# Patient Record
Sex: FEMALE | Race: ASIAN | NOT HISPANIC OR LATINO | URBAN - METROPOLITAN AREA
[De-identification: names, ages, dates, MRNs, and addresses within clinical notes are randomized per-mention and may not be internally consistent; named-entity substitution may affect disease eponyms.]

---

## 2017-08-05 ENCOUNTER — INPATIENT (INPATIENT)
Facility: HOSPITAL | Age: 62
LOS: 1 days | Discharge: HOME | End: 2017-08-07
Attending: HOSPITALIST | Admitting: HOSPITALIST
Payer: MEDICAID

## 2017-08-05 VITALS
SYSTOLIC BLOOD PRESSURE: 136 MMHG | DIASTOLIC BLOOD PRESSURE: 71 MMHG | HEART RATE: 75 BPM | TEMPERATURE: 98 F | OXYGEN SATURATION: 96 % | RESPIRATION RATE: 16 BRPM

## 2017-08-05 DIAGNOSIS — R55 SYNCOPE AND COLLAPSE: ICD-10-CM

## 2017-08-05 DIAGNOSIS — M79.605 PAIN IN LEFT LEG: ICD-10-CM

## 2017-08-05 DIAGNOSIS — I10 ESSENTIAL (PRIMARY) HYPERTENSION: ICD-10-CM

## 2017-08-05 DIAGNOSIS — I48.91 UNSPECIFIED ATRIAL FIBRILLATION: ICD-10-CM

## 2017-08-05 DIAGNOSIS — Z29.9 ENCOUNTER FOR PROPHYLACTIC MEASURES, UNSPECIFIED: ICD-10-CM

## 2017-08-05 DIAGNOSIS — R63.8 OTHER SYMPTOMS AND SIGNS CONCERNING FOOD AND FLUID INTAKE: ICD-10-CM

## 2017-08-05 LAB
ALBUMIN SERPL ELPH-MCNC: 3.5 G/DL — SIGNIFICANT CHANGE UP (ref 3.3–5)
ALP SERPL-CCNC: 85 U/L — SIGNIFICANT CHANGE UP (ref 40–120)
ALT FLD-CCNC: 11 U/L — SIGNIFICANT CHANGE UP (ref 4–33)
APTT BLD: 30.8 SEC — SIGNIFICANT CHANGE UP (ref 27.5–37.4)
AST SERPL-CCNC: 14 U/L — SIGNIFICANT CHANGE UP (ref 4–32)
BASOPHILS # BLD AUTO: 0.03 K/UL — SIGNIFICANT CHANGE UP (ref 0–0.2)
BASOPHILS NFR BLD AUTO: 0.3 % — SIGNIFICANT CHANGE UP (ref 0–2)
BILIRUB SERPL-MCNC: 0.2 MG/DL — SIGNIFICANT CHANGE UP (ref 0.2–1.2)
BUN SERPL-MCNC: 11 MG/DL — SIGNIFICANT CHANGE UP (ref 7–23)
CALCIUM SERPL-MCNC: 8.5 MG/DL — SIGNIFICANT CHANGE UP (ref 8.4–10.5)
CHLORIDE SERPL-SCNC: 105 MMOL/L — SIGNIFICANT CHANGE UP (ref 98–107)
CK MB BLD-MCNC: 2.96 NG/ML — SIGNIFICANT CHANGE UP (ref 1–4.7)
CK MB BLD-MCNC: 3.16 NG/ML — SIGNIFICANT CHANGE UP (ref 1–4.7)
CK MB BLD-MCNC: SIGNIFICANT CHANGE UP (ref 0–2.5)
CK MB BLD-MCNC: SIGNIFICANT CHANGE UP (ref 0–2.5)
CK SERPL-CCNC: 102 U/L — SIGNIFICANT CHANGE UP (ref 25–170)
CK SERPL-CCNC: 112 U/L — SIGNIFICANT CHANGE UP (ref 25–170)
CO2 SERPL-SCNC: 24 MMOL/L — SIGNIFICANT CHANGE UP (ref 22–31)
CREAT SERPL-MCNC: 0.61 MG/DL — SIGNIFICANT CHANGE UP (ref 0.5–1.3)
EOSINOPHIL # BLD AUTO: 0.08 K/UL — SIGNIFICANT CHANGE UP (ref 0–0.5)
EOSINOPHIL NFR BLD AUTO: 0.8 % — SIGNIFICANT CHANGE UP (ref 0–6)
GLUCOSE SERPL-MCNC: 134 MG/DL — HIGH (ref 70–99)
HBA1C BLD-MCNC: 6.6 % — HIGH (ref 4–5.6)
HCT VFR BLD CALC: 35.6 % — SIGNIFICANT CHANGE UP (ref 34.5–45)
HGB BLD-MCNC: 11.1 G/DL — LOW (ref 11.5–15.5)
IMM GRANULOCYTES # BLD AUTO: 0.02 # — SIGNIFICANT CHANGE UP
IMM GRANULOCYTES NFR BLD AUTO: 0.2 % — SIGNIFICANT CHANGE UP (ref 0–1.5)
INR BLD: 1.03 — SIGNIFICANT CHANGE UP (ref 0.88–1.17)
LYMPHOCYTES # BLD AUTO: 2.85 K/UL — SIGNIFICANT CHANGE UP (ref 1–3.3)
LYMPHOCYTES # BLD AUTO: 29.7 % — SIGNIFICANT CHANGE UP (ref 13–44)
MAGNESIUM SERPL-MCNC: 2 MG/DL — SIGNIFICANT CHANGE UP (ref 1.6–2.6)
MCHC RBC-ENTMCNC: 26.8 PG — LOW (ref 27–34)
MCHC RBC-ENTMCNC: 31.2 % — LOW (ref 32–36)
MCV RBC AUTO: 86 FL — SIGNIFICANT CHANGE UP (ref 80–100)
MONOCYTES # BLD AUTO: 0.52 K/UL — SIGNIFICANT CHANGE UP (ref 0–0.9)
MONOCYTES NFR BLD AUTO: 5.4 % — SIGNIFICANT CHANGE UP (ref 2–14)
NEUTROPHILS # BLD AUTO: 6.09 K/UL — SIGNIFICANT CHANGE UP (ref 1.8–7.4)
NEUTROPHILS NFR BLD AUTO: 63.6 % — SIGNIFICANT CHANGE UP (ref 43–77)
NRBC # FLD: 0 — SIGNIFICANT CHANGE UP
PLATELET # BLD AUTO: 250 K/UL — SIGNIFICANT CHANGE UP (ref 150–400)
PMV BLD: 11.1 FL — SIGNIFICANT CHANGE UP (ref 7–13)
POTASSIUM SERPL-MCNC: 4 MMOL/L — SIGNIFICANT CHANGE UP (ref 3.5–5.3)
POTASSIUM SERPL-SCNC: 4 MMOL/L — SIGNIFICANT CHANGE UP (ref 3.5–5.3)
PROT SERPL-MCNC: 7.4 G/DL — SIGNIFICANT CHANGE UP (ref 6–8.3)
PROTHROM AB SERPL-ACNC: 11.6 SEC — SIGNIFICANT CHANGE UP (ref 9.8–13.1)
RBC # BLD: 4.14 M/UL — SIGNIFICANT CHANGE UP (ref 3.8–5.2)
RBC # FLD: 12.5 % — SIGNIFICANT CHANGE UP (ref 10.3–14.5)
SODIUM SERPL-SCNC: 141 MMOL/L — SIGNIFICANT CHANGE UP (ref 135–145)
TROPONIN T SERPL-MCNC: < 0.06 NG/ML — SIGNIFICANT CHANGE UP (ref 0–0.06)
TROPONIN T SERPL-MCNC: < 0.06 NG/ML — SIGNIFICANT CHANGE UP (ref 0–0.06)
TSH SERPL-MCNC: 0.84 UIU/ML — SIGNIFICANT CHANGE UP (ref 0.27–4.2)
WBC # BLD: 9.59 K/UL — SIGNIFICANT CHANGE UP (ref 3.8–10.5)
WBC # FLD AUTO: 9.59 K/UL — SIGNIFICANT CHANGE UP (ref 3.8–10.5)

## 2017-08-05 PROCEDURE — 71020: CPT | Mod: 26

## 2017-08-05 PROCEDURE — 99223 1ST HOSP IP/OBS HIGH 75: CPT

## 2017-08-05 PROCEDURE — 70450 CT HEAD/BRAIN W/O DYE: CPT | Mod: 26

## 2017-08-05 PROCEDURE — 99233 SBSQ HOSP IP/OBS HIGH 50: CPT

## 2017-08-05 RX ORDER — ACETAMINOPHEN 500 MG
650 TABLET ORAL EVERY 6 HOURS
Qty: 0 | Refills: 0 | Status: DISCONTINUED | OUTPATIENT
Start: 2017-08-05 | End: 2017-08-07

## 2017-08-05 RX ORDER — ATENOLOL 25 MG/1
25 TABLET ORAL DAILY
Qty: 0 | Refills: 0 | Status: DISCONTINUED | OUTPATIENT
Start: 2017-08-05 | End: 2017-08-07

## 2017-08-05 RX ORDER — ACETAMINOPHEN 500 MG
2 TABLET ORAL
Qty: 0 | Refills: 0 | COMMUNITY

## 2017-08-05 RX ORDER — APIXABAN 2.5 MG/1
5 TABLET, FILM COATED ORAL EVERY 12 HOURS
Qty: 0 | Refills: 0 | Status: DISCONTINUED | OUTPATIENT
Start: 2017-08-05 | End: 2017-08-07

## 2017-08-05 RX ORDER — ATENOLOL 25 MG/1
1 TABLET ORAL
Qty: 0 | Refills: 0 | COMMUNITY

## 2017-08-05 RX ADMIN — ATENOLOL 25 MILLIGRAM(S): 25 TABLET ORAL at 19:48

## 2017-08-05 RX ADMIN — APIXABAN 5 MILLIGRAM(S): 2.5 TABLET, FILM COATED ORAL at 22:30

## 2017-08-05 NOTE — H&P ADULT - ASSESSMENT
63 y/o Vanessa speaking female (translation done through [employer] ID # [id number]), with a PmHx of Afib on Eliquis, HTN, is being admitted to telemetry for unwitnessed fall r/o syncope and left leg pain.

## 2017-08-05 NOTE — CONSULT NOTE ADULT - ATTENDING COMMENTS
Patient seen and examined, agree with above assessment and plan as transcribed above.    - suspect vasovagal episode in the setting of pain  - F/u echo    Den Scott MD, FACC  [facility], [facility]  [address] [place] [address]  PHONE:  [phone]  BEEPER : [phone]

## 2017-08-05 NOTE — ED PROVIDER NOTE - OBJECTIVE STATEMENT
62yoF hx of afib on eliquis pw unwitnessed fall vs syncope. pt was getting up in the morning with cramps in her left calf and foot, trying to work them out. went to the bathroom. got up and does not remember what happened. pt was found on ground by  and son who said her eyes were rolled back and she was non responsive for approx 1 minute. then returned to and came back to baseline. pt has no complaints, only some mild left foot pain that is baseline. no chest pain, shortness of breath, abd pain, headache, numbness, chills, fevers, cough or other issues.

## 2017-08-05 NOTE — ED ADULT NURSE NOTE - OBJECTIVE STATEMENT
Patient arrived to ed room 15 a&ox4, ambulatory with assistance, complaining of left calf pain. Patient request's son to translate. Per patient, woke up to go to the bathroom, had severe leg cramp, fell to ground, cannot recall anything until EMS arrived. States no chest pain, dizziness, nausea, disorientation prior to cramp/fall. Pain chronic, states pmd has evaluated it and "everything is normal". Calf tender at touch, patient states looks more swollen to her. No redness noted. Per son similar episode, patient developed cramping in foot, became weak, son caught her before hitting ground. Patient is on Eliquis for Afib, Afib on cardiac monitor. Son at bedside. Safety maintained.

## 2017-08-05 NOTE — ED ADULT NURSE NOTE - CHIEF COMPLAINT QUOTE
pt.  brought in by EMS , Pt. on Eliquis w/ hx. AFIB c/o syncopal episode. Pt. was found on the bathroom floor by family. As per EMS pt. LOC , and is c/o of bilateral feet pain. As per EMS pt. returned to baseline at moment. Pt. Yessyjab speaking , attempted to use  phone , as per phone service unable to find a [employer]  at this time.

## 2017-08-05 NOTE — H&P ADULT - RS GEN PE MLT RESP DETAILS PC
good air movement/respirations non-labored/breath sounds equal/airway patent/no chest wall tenderness/clear to auscultation bilaterally

## 2017-08-05 NOTE — ED PROVIDER NOTE - PROGRESS NOTE DETAILS
pt offered free interpretting services. declined, wished to have son interpret instead. - Rebel Pritchard  [phone] discussed case with Dr. CLOVER Whatley, he is aware, and wishes to admit to house medicine team. - discussed with Dr. Quintanilla. will admit. paged MAR.   - discussed with patient and family, okay with plan. pt offered free interpreting services. declined, wished to have son interpret instead. - Rebel Pritchard  [phone]

## 2017-08-05 NOTE — H&P ADULT - HISTORY OF PRESENT ILLNESS
63 y/o Vanessa speaking female (translation done through [employer] ID # [id number]), with a PmHx of Afib on Eliquis, HTN, presented to [facility] ED c/o left leg pain and unwitnessed fall. Pt states at about 1900hrs last night she had gone to a wedding locally and returned back to her house at around 2300hrs. She states at around 0400hrs, she had gotten up from sleep c/o left leg/foot cramping. She states she tried to work out the pain and then got up to the bathroom and then she must have passed out. She states her  found her on the floor and then helped her up. She states she thinks she was unresponsive for about 1 minute. She denies any fever, chills, chest pain, HA, dizziness, blurred vision, n/c, sob, recent travel. The only complaint she has is left leg pain. She appears comfortable at this time. She is being admitted to telemetry for unwitnessed fall.

## 2017-08-05 NOTE — ED PROVIDER NOTE - ATTENDING CONTRIBUTION TO CARE
63 y/o F with h/o HTN, AF on eliquis BIB EMS after a syncopal episode at home.  Pt states she frequently wakes up with cramping pain to her left foot.  This happened this morning and she got up to go to the bathroom.  While walking out of the bathroom, she states the pain got so bad that she passed out, falling to the ground.  The fall was unwitnessed, but family heard her fall and when they went to check on her, she was lying on the ground.  (+)LOC of approximately 1 minute.  She denies any preceding illness, fever, chills, cp, sob, palpitations, abd pain, n/v.  Pt is now at her baseline, no reported injuries or pain.  Well appearing, lying comfortably in stretcher, awake and alert, nontoxic.  VSS.  NCAT, EOMI, PERRL.  No conjunctival pallor.  Neck is supple with no midline tenderness or deformity.  Lungs cta bl.  Cards nl S1/S2, irregularly irregular, no MRG.  Abd soft ntnd.  No pedal edema or calf tenderness, (+)mild tenderness to dorsal midfoot with no bony tenderness, no swelling or skin changes, no deformity.  Plan for ekg, labs, cxr ct head.  As pt is on eliquis, will r/o ich, plan for tele admission for synocpe workup.

## 2017-08-05 NOTE — H&P ADULT - PROBLEM SELECTOR PLAN 1
ekg/telemetry, f/u ce x 2, mg, tsh, last echo 10/4/16 was normal, ct head neg, cta chest neg, cardio c/s Dr. Whatley

## 2017-08-05 NOTE — H&P ADULT - ATTENDING COMMENTS
Patient seen at bedside. She is a 62F presenting s/p mechanical fall, being admitted to evaluate syncope. Currently has no complaints at this time and is comfortable.  VSS  General: NAD resting comfortably  HEENT: EOMI PERRLA, moist MMM, no JVD  CV: S1S2 no MRG  Lungs: CTA BL  Abdomen: soft NTND +BS  Ext: No CCE +WWP  Neuro: no focal deficits    Labs&Imaging reviewed    A/P:  62F here for evaluation of syncopal episode  1. Syncope - ddx vasovagal v orthostatic hypotension v. arrhymia. CT head negative and no focal deficits. Admit to telemetry, follow up cardiac enzymes, orthostatic pressures, possible echocardiogram although normal less than a year ago. Appreciate cardiology recs  2. AFib - c/w Eliquis for AC and Atenolol for Rate ctrl  3. HTN - c/w atenolol   4. LLE pain - apparently fell on her LLE, appears equal in size and currently not TTP. pain control

## 2017-08-05 NOTE — CONSULT NOTE ADULT - SUBJECTIVE AND OBJECTIVE BOX
Cardiology/Vascular Medicine Inpatient Consultation Note    Patient known to me from previous admission.  Told ER to admit pt to hospitalist service.      HISTORY OF PRESENT ILLNESS:  Patient is a 63 y/o Vanessa speaking female (translation done through [employer] ID # [id number]), with a PmHx of Afib on Eliquis, HTN, presented to [facility] ED c/o left leg pain and unwitnessed fall. Pt states at about 1900hrs last night she had gone to a wedding locally and returned back to her house at around 2300hrs. She states at around 0400hrs, she had gotten up from sleep c/o left leg/foot cramping. She states she tried to work out the pain and then got up to the bathroom and then she must have passed out. She states her  found her on the floor and then helped her up. She states she thinks she was unresponsive for about 1 minute. She denies any fever, chills, chest pain, HA, dizziness, blurred vision, n/c, sob, recent travel. The only complaint she has is left leg pain. She appears comfortable at this time. She is being admitted to telemetry for unwitnessed fall. (05 Aug 2017 12:13)          Allergies    No Known Allergies    Intolerances    	    MEDICATIONS:  apixaban 5 milliGRAM(s) Oral every 12 hours  ATENolol  Tablet 25 milliGRAM(s) Oral daily        acetaminophen   Tablet. 650 milliGRAM(s) Oral every 6 hours PRN            PAST MEDICAL & SURGICAL HISTORY:  Afib  HTN (hypertension)  No significant past surgical history    FAMILY HISTORY:  No pertinent family history in first degree relatives      SOCIAL HISTORY:    [ ] Non-smoker      REVIEW OF SYSTEMS:  CONSTITUTIONAL: No fever, weight loss, or fatigue  EYES: No eye pain, visual disturbances, or discharge  ENMT:  No difficulty hearing, tinnitus, vertigo; No sinus or throat pain  NECK: No pain or stiffness  RESPIRATORY: No cough, wheezing, chills or hemoptysis; No Shortness of Breath  CARDIOVASCULAR: As above  GASTROINTESTINAL: No abdominal or epigastric pain. No nausea, vomiting, or hematemesis; No diarrhea or constipation. No melena or hematochezia.  GENITOURINARY: No dysuria, frequency, hematuria, or incontinence  NEUROLOGICAL: No headaches, memory loss, loss of strength, numbness, or tremors  SKIN: No itching, burning, rashes, or lesions   LYMPH Nodes: No enlarged glands  ENDOCRINE: No heat or cold intolerance; No hair loss  MUSCULOSKELETAL: No joint pain or swelling; No muscle, back, or extremity pain  PSYCHIATRIC: No depression, anxiety, mood swings, or difficulty sleeping  HEME/LYMPH: No easy bruising, or bleeding gums  ALLERGY AND IMMUNOLOGIC: No hives or eczema	        PHYSICAL EXAM:  T(C): 36.3 (08-05-17 @ 16:08), Max: 36.7 (08-05-17 @ 08:50)  HR: 60 (08-05-17 @ 16:08) (60 - 78)  BP: 141/65 (08-05-17 @ 16:08) (136/71 - 152/97)  RR: 18 (08-05-17 @ 16:08) (16 - 18)  SpO2: 100% (08-05-17 @ 16:08) (96% - 100%)  Wt(kg): --  I&O's Summary      Appearance: Normal	  HEENT:   Normal oral mucosa, PERRL, EOMI	  Lymphatic: No lymphadenopathy  Cardiovascular: Normal S1 S2, No JVD, No murmurs, No edema  Respiratory: Lungs clear to auscultation	  Psychiatry: A & O x 3, Mood & affect appropriate  Gastrointestinal:  Soft, Non-tender, + BS	  Skin: No rashes, No ecchymoses, No cyanosis	  Neurologic: Non-focal  Extremities: Normal range of motion, No clubbing, cyanosis or edema  Vascular: Peripheral pulses palpable 2+ bilaterally      LABS:	 	                          11.1   9.59  )-----------( 250      ( 05 Aug 2017 06:45 )             35.6     08-05    141  |  105  |  11  ----------------------------<  134<H>  4.0   |  24  |  0.61    Ca    8.5      05 Aug 2017 06:45  Mg     2.0     08-05    TPro  7.4  /  Alb  3.5  /  TBili  0.2  /  DBili  x   /  AST  14  /  ALT  11  /  AlkPhos  85  08-05      proBNP:   Lipid Profile:   HgA1c: Hemoglobin A1C, Whole Blood: 6.6 % (08-05 @ 13:03)    TSH: Thyroid Stimulating Hormone, Serum: 0.84 uIU/mL (08-05 @ 13:03)        CARDIAC MARKERS:      CKMB: 2.96 ng/mL (08-05 @ 13:03)  CKMB: 3.16 ng/mL (08-05 @ 06:45)    < from: Transthoracic Echocardiogram (10.04.16 @ 11:30) >  Patient name: NEEL JUDGE  YOB: 1955   Age: 61 (F)   MR#: [id number]  Study Date: 10/4/2016  [facility]  Study quality: Technically good  Referring Physician: Quincy Whatley MD  Blood Pressure: 139/63mmHg  Height: 152 cm  Weight: 75 kg  BSA: 1.7 m2  ------------------------------------------------------------------------  PROCEDURE: Transthoracic echocardiogram with 2-D, M-Mode  and complete spectral and color flow Doppler.  INDICATION: Syncope and collapse (R55)  ------------------------------------------------------------------------  DIMENSIONS:  Dimensions:     Normal Values:  LA:     3.1 cm    2.0 - 4.0 cm  Ao:     3.2 cm    2.0 - 3.8 cm  SEPTUM: 1.0 cm    0.6 - 1.2 cm  PWT:    1.0 cm0.6 - 1.1 cm  LVIDd:  4.6 cm    3.0 - 5.6 cm  LVIDs:  2.6 cm    1.8 - 4.0 cm  Derived Variables:  LVMI: 92 g/m2  RWT: 0.43  Fractional short: 43 %  Ejection Fraction (Cliffordicholtz): 75 %  ------------------------------------------------------------------------  OBSERVATIONS:  Mitral Valve: Normal mitral valve. Minimal mitral  regurgitation.  Aortic Root: Normal aortic root.  Aortic Valve: Normal trileaflet aortic valve. No aortic  valve regurgitation seen.  Left Atrium: Normal left atrium.  LA volume index = 17  cc/m2.  Left Ventricle: Normal left ventricular systolic function.  No segmental wall motion abnormalities. Normal left  ventricular internal dimensions and wall thicknesses.  Right Heart: Normal right atrium. Normal right ventricular  size and function. Normal tricuspid valve. Mild tricuspid  regurgitation. Normal pulmonic valve.  Pericardium/PleuraNormal pericardium with no pericardial  effusion.  Hemodynamic: Estimated right ventricular systolic pressure  equals 38 mm Hg, assumingright atrial pressure equals 10  mm Hg, consistent with borderline pulmonary hypertension.  ------------------------------------------------------------------------  CONCLUSIONS:  1. Normal left ventricular internal dimensions and wall  thicknesses.  2. Normal left ventricular systolic function. No segmental  wall motion abnormalities.  3. Normal right ventricular size and function.  4. Normal tricuspid valve. Mild tricuspid regurgitation.  5. Estimated pulmonary artery systolic pressure equals 38  mm Hg, assuming right atrial pressure equals 10  mm Hg,  consistent with borderline pulmonary hypertension.  ------------------------------------------------------------------------  Confirmed on  10/4/2016 - 13:18:06 by Quincy Whatley MD, SAHRA  ------------------------------------------------------------------------    < end of copied text >    < from: CT Head No Cont (08.05.17 @ 08:38) >  EXAM:  CT BRAIN        PROCEDURE DATE:  Aug  5 2017         INTERPRETATION:  NONCONTRAST CT OF THE BRAIN DATED 8/5/2017.    CLINICAL HISTORY: Unwitnessed fall on anticoagulation.    TECHNIQUE: Axial CT images are obtained from the cranial vertex tothe   skull base without the administration of IV contrast.    COMPARISON: CT brain 10/3/2016.    FINDINGS:    There is no acute intra-axial or extra-axial hemorrhage. There is no mass   effect or shift of the midline. The basal cisterns are not effaced. The   ventricles and sulci are normal in size and configuration for age. There   are mild chronic ischemic changes seen in the frontoparietal white   matter. There is no CT evidence of a large acute or transcortical   infarct. There is redemonstration of a partially empty sella.    The visualized paranasal sinuses and mastoid air cells are well aerated.   The bony calvarium is intact, there is redemonstration of slight lucency   and sclerosis throughout the clivus and incidental ossification of the   anterior falx..    IMPRESSION:    No evidence of an acute intracranial hemorrhage or depressed calvarial   fracture.                LING FOWLER M.D., RADIOLOGY RESIDENT  This document has been electronically signed.  MAYUR MCDONOUGH M.D., ATTENDING RADIOLOGIST  This document has been electronically signed. Aug  5 2017  9:27AM                  < end of copied text >

## 2017-08-05 NOTE — ED PROVIDER NOTE - MEDICAL DECISION MAKING DETAILS
62yoF hx of afib on eliquis pw syncope vs trip and fall with + LOC. concern for cardiac origin. will rule out intracranial with head CT. labs, trops, ekg, cxr, ct head, admit for echo and tele monitoring. pt with poor cardiac follow up.

## 2017-08-05 NOTE — H&P ADULT - NSHPPHYSICALEXAM_GEN_ALL_CORE
Vital Signs Last 24 Hrs  T(C): 36.7 (05 Aug 2017 08:50), Max: 36.7 (05 Aug 2017 08:50)  T(F): 98.1 (05 Aug 2017 08:50), Max: 98.1 (05 Aug 2017 08:50)  HR: 75 (05 Aug 2017 08:50) (75 - 78)  BP: 144/75 (05 Aug 2017 08:50) (136/71 - 152/97)  BP(mean): --  RR: 16 (05 Aug 2017 08:50) (16 - 16)  SpO2: 98% (05 Aug 2017 08:50) (96% - 98%)    EKG: Afib @ 74, no changes

## 2017-08-05 NOTE — ED ADULT TRIAGE NOTE - CHIEF COMPLAINT QUOTE
pt.  brought in by EMS , Pt. on Eliquis w/ hx. AFIB c/o syncopal episode. Pt. was found on the bathroom floor by family. As per EMS pt. LOC , and is c/o of bilateral feet pain. As per EMS pt. returned to baseline at moment. Pt. Yessyjab speaking , attempted to use  phone , as per phone service unable to find a [employer]  at this time.

## 2017-08-05 NOTE — H&P ADULT - NEGATIVE OPHTHALMOLOGIC SYMPTOMS
no photophobia/no diplopia/no loss of vision R/no blurred vision R/no loss of vision L/no blurred vision L

## 2017-08-05 NOTE — H&P ADULT - NSHPSOCIALHISTORY_GEN_ALL_CORE
Marital Status:     Occupation: Homemaker    Tobacco Use: neg    ETOH Use: neg    Flu Vaccine:       neg                           Pneumonia Vaccine: neg

## 2017-08-05 NOTE — CONSULT NOTE ADULT - SUBJECTIVE AND OBJECTIVE BOX
HISTORY OF PRESENT ILLNESS:  61 y/o Vanessa speaking female (translation done through [employer] ID # [id number]), with a PmHx of Afib on Eliquis, HTN, presented to [facility] ED c/o left leg pain and unwitnessed fall. Pt states at about 1900hrs last night she had gone to a wedding locally and returned back to her house at around 2300hrs. She states at around 0400hrs, she had gotten up from sleep c/o left leg/foot cramping. She states she tried to work out the pain and then got up to the bathroom and then she must have passed out. She states her  found her on the floor and then helped her up. She states she thinks she was unresponsive for about 1 minute. She denies any fever, chills, chest pain, HA, dizziness, blurred vision, n/c, sob, recent travel. The only complaint she has is left leg pain. She appears comfortable at this time. She is being admitted to telemetry for unwitnessed fall.      PAST MEDICAL & SURGICAL HISTORY:  Afib  HTN (hypertension)  No significant past surgical history      [ ] Diabetes   [x ] Hypertension  [ ] Hyperlipidemia  [ ] CAD  [ ] PCI  [ ] CABG    PREVIOUS DIAGNOSTIC TESTING:    [ ] Echocardiogram: 10/2016      EF 75%      Nl LVS fx NL RV fx  Mild TR   [ ]  Catheterization:  [ ] Stress Test:  	    MEDICATIONS:  apixaban 5 milliGRAM(s) Oral every 12 hours  ATENolol  Tablet 25 milliGRAM(s) Oral daily      acetaminophen   Tablet. 650 milliGRAM(s) Oral every 6 hours PRN    Allergies    No Known Allergies  Intolerances  FAMILY HISTORY:  No pertinent family history in first degree relatives      SOCIAL HISTORY:    [ x] Non-smoker  [ ] Former Smoker  [ ] Current Smoker  [ ] Alcohol      REVIEW OF SYSTEMS:  [ ]chest pain  [  ]shortness of breath  [  ]palpitations  [ x ]syncope  PTA  [ ]near syncope [  ]diplopia  [  ]altered mental status   [  ]fevers  [ ]chills [ ]nausea  [ ] vomiting  [ ]abdominal pain  [ ]melena  [ ]BRBPR  [  ]epistaxis  [  ]rash  [X ] L lower extremity pain           [x ] All others negative	  [ ] Unable to obtain    PHYSICAL EXAM:  T(C): 36.7 (08-05-17 @ 08:50), Max: 36.7 (08-05-17 @ 08:50)  HR: 75 (08-05-17 @ 08:50) (75 - 78)  BP: 144/75 (08-05-17 @ 08:50) (136/71 - 152/97)  RR: 16 (08-05-17 @ 08:50) (16 - 16)  SpO2: 98% (08-05-17 @ 08:50) (96% - 98%)  Wt(kg): --  I&O's Summary      Appearance: Nosign of acute distress 	  HEENT:   Normal oral mucosa, PERRL, EOMI	  Lymphatic: No lymphadenopathy  Cardiovascular: Normal S1 S2, No JVD, II/ VI Systolic murmur, No edema  Respiratory: Lungs clear to auscultation	  Gastrointestinal:  Soft, Non-tender, + BS	  Skin: No rashes, No ecchymoses, No cyanosis	  Neurologic: Non-focal  Extremities:  No clubbing, cyanosis or edema B/L LE's   Vascular: Peripheral pulses palpable 2+ bilaterally    TELEMETRY: 	    ECG: AF @ 74 bpm 	  RADIOLOGY:   CT Head      No evidence of an acute intracranial hemorrhage or depressed calvarial   fracture.  CXR     Clear lungs. No pleural effusions or pneumothorax.    Enlarged appearing cardiac silhouette.   OTHER: 	  	  LABS:	 	    CARDIAC MARKERS:      CKMB: 3.16 ng/mL (08-05 @ 06:45)                          11.1   9.59  )-----------( 250      ( 05 Aug 2017 06:45 )             35.6     08-05    141  |  105  |  11  ----------------------------<  134<H>  4.0   |  24  |  0.61    Ca    8.5      05 Aug 2017 06:45    TPro  7.4  /  Alb  3.5  /  TBili  0.2  /  DBili  x   /  AST  14  /  ALT  11  /  AlkPhos  85  08-05    proBNP:   Lipid Profile:   HgA1c:   TSH:     ASSESSMENT/PLAN: 	61 y/o Vanessa speaking female (translation done through [employer] ID # [id number]), with a PmHx of Afib on Eliquis, HTN, presented to [facility] ED c/o left leg pain and unwitnessed fall. Pt states at about 1900hrs last night she had gone to a wedding locally and returned back to her house at around 2300hrs. She states at around 0400hrs, she had gotten up from sleep c/o left leg/foot cramping. She states she tried to work out the pain and then got up to the bathroom and then she must have passed out. She states her  found her on the floor and then helped her up. She states she thinks she was unresponsive for about 1 minute. She denies any fever, chills, chest pain, HA, dizziness, blurred vision, n/c, sob, recent travel. The only complaint she has is left leg pain. She appears comfortable at this time. She is being admitted to telemetry for unwitnessed fall.    Spoke with pt via  phone [employer] ID [id number] Earl, confirmed above events and reports all that hurts now is my leg.     HX A fib with elevated Chads Vasc Score Life long A/C recommended  for CVA prevention c/w home Eliquis     c/w atenolol  CT head no acute pathology   EKG A fib rate controlled   CE neg x 1  f/u CE #2   Echo [echo 2016 nl lvs fx]   Admit to tele   check orthostatics   TSH pending HISTORY OF PRESENT ILLNESS:  63 y/o Vanessa speaking female (translation done through [employer] ID # [id number]), with a PmHx of Afib on Eliquis, HTN, presented to [facility] ED c/o left leg pain and unwitnessed fall. Pt states at about 1900hrs last night she had gone to a wedding locally and returned back to her house at around 2300hrs. She states at around 0400hrs, she had gotten up from sleep c/o left leg/foot cramping. She states she tried to work out the pain and then got up to the bathroom and then she must have passed out. She states her  found her on the floor and then helped her up. She states she thinks she was unresponsive for about 1 minute. She denies any fever, chills, chest pain, HA, dizziness, blurred vision, n/c, sob, recent travel. The only complaint she has is left leg pain. She appears comfortable at this time. She is being admitted to telemetry for unwitnessed fall.      PAST MEDICAL & SURGICAL HISTORY:  Afib  HTN (hypertension)  No significant past surgical history      [ ] Diabetes   [x ] Hypertension  [ ] Hyperlipidemia  [ ] CAD  [ ] PCI  [ ] CABG    PREVIOUS DIAGNOSTIC TESTING:    [ ] Echocardiogram: 10/2016      EF 75%      Nl LVS fx NL RV fx  Mild TR   [ ]  Catheterization:  [ ] Stress Test:  	    MEDICATIONS:  apixaban 5 milliGRAM(s) Oral every 12 hours  ATENolol  Tablet 25 milliGRAM(s) Oral daily      acetaminophen   Tablet. 650 milliGRAM(s) Oral every 6 hours PRN    Allergies    No Known Allergies  Intolerances  FAMILY HISTORY:  No pertinent family history in first degree relatives      SOCIAL HISTORY:    [ x] Non-smoker  [ ] Former Smoker  [ ] Current Smoker  [ ] Alcohol      REVIEW OF SYSTEMS:  [ ]chest pain  [  ]shortness of breath  [  ]palpitations  [ x ]syncope  PTA  [ ]near syncope [  ]diplopia  [  ]altered mental status   [  ]fevers  [ ]chills [ ]nausea  [ ] vomiting  [ ]abdominal pain  [ ]melena  [ ]BRBPR  [  ]epistaxis  [  ]rash  [X ] L lower extremity pain           [x ] All others negative	  [ ] Unable to obtain    PHYSICAL EXAM:  T(C): 36.7 (08-05-17 @ 08:50), Max: 36.7 (08-05-17 @ 08:50)  HR: 75 (08-05-17 @ 08:50) (75 - 78)  BP: 144/75 (08-05-17 @ 08:50) (136/71 - 152/97)  RR: 16 (08-05-17 @ 08:50) (16 - 16)  SpO2: 98% (08-05-17 @ 08:50) (96% - 98%)  Wt(kg): --  I&O's Summary      Appearance: Nosign of acute distress 	  HEENT:   Normal oral mucosa, PERRL, EOMI	  Lymphatic: No lymphadenopathy  Cardiovascular: Normal S1 S2 IRR IRR, No JVD, II/ VI Systolic murmur, No edema  Respiratory: Lungs clear to auscultation	  Gastrointestinal:  Soft, Non-tender, + BS	  Skin: No rashes, No ecchymoses, No cyanosis	  Neurologic: Non-focal  Extremities:  No clubbing, cyanosis or edema B/L LE's   Vascular: Peripheral pulses palpable 2+ bilaterally    TELEMETRY: 	    ECG: AF @ 74 bpm 	  RADIOLOGY:   CT Head      No evidence of an acute intracranial hemorrhage or depressed calvarial   fracture.  CXR     Clear lungs. No pleural effusions or pneumothorax.    Enlarged appearing cardiac silhouette.   OTHER: 	  	  LABS:	 	    CARDIAC MARKERS:      CKMB: 3.16 ng/mL (08-05 @ 06:45)                          11.1   9.59  )-----------( 250      ( 05 Aug 2017 06:45 )             35.6     08-05    141  |  105  |  11  ----------------------------<  134<H>  4.0   |  24  |  0.61    Ca    8.5      05 Aug 2017 06:45    TPro  7.4  /  Alb  3.5  /  TBili  0.2  /  DBili  x   /  AST  14  /  ALT  11  /  AlkPhos  85  08-05    proBNP:   Lipid Profile:   HgA1c:   TSH:     ASSESSMENT/PLAN: 	63 y/o Vanessa speaking female (translation done through [employer] ID # [id number]), with a PmHx of Afib on Eliquis, HTN, presented to [facility] ED c/o left leg pain and unwitnessed fall. Pt states at about 1900hrs last night she had gone to a wedding locally and returned back to her house at around 2300hrs. She states at around 0400hrs, she had gotten up from sleep c/o left leg/foot cramping. She states she tried to work out the pain and then got up to the bathroom and then she must have passed out. She states her  found her on the floor and then helped her up. She states she thinks she was unresponsive for about 1 minute. She denies any fever, chills, chest pain, HA, dizziness, blurred vision, n/c, sob, recent travel. The only complaint she has is left leg pain. She appears comfortable at this time. She is being admitted to telemetry for unwitnessed fall.    Spoke with pt via  phone [employer] ID [id number] Earl, confirmed above events and reports all that hurts now is my leg.     HX A fib with elevated Chads Vasc Score Life long A/C recommended  for CVA prevention c/w home Eliquis     c/w atenolol  CT head no acute pathology   EKG A fib rate controlled   CE neg x 1  f/u CE #2   Echo [echo 2016 nl lvs fx]   Admit to tele   check orthostatics   TSH pending

## 2017-08-05 NOTE — H&P ADULT - NEGATIVE CARDIOVASCULAR SYMPTOMS
no palpitations/no paroxysmal nocturnal dyspnea/no peripheral edema/no dyspnea on exertion/no chest pain

## 2017-08-06 DIAGNOSIS — R73.09 OTHER ABNORMAL GLUCOSE: ICD-10-CM

## 2017-08-06 LAB
BUN SERPL-MCNC: 13 MG/DL — SIGNIFICANT CHANGE UP (ref 7–23)
CALCIUM SERPL-MCNC: 8.9 MG/DL — SIGNIFICANT CHANGE UP (ref 8.4–10.5)
CHLORIDE SERPL-SCNC: 104 MMOL/L — SIGNIFICANT CHANGE UP (ref 98–107)
CHOLEST SERPL-MCNC: 149 MG/DL — SIGNIFICANT CHANGE UP (ref 120–199)
CO2 SERPL-SCNC: 24 MMOL/L — SIGNIFICANT CHANGE UP (ref 22–31)
CREAT SERPL-MCNC: 0.62 MG/DL — SIGNIFICANT CHANGE UP (ref 0.5–1.3)
GLUCOSE SERPL-MCNC: 97 MG/DL — SIGNIFICANT CHANGE UP (ref 70–99)
HCT VFR BLD CALC: 33.6 % — LOW (ref 34.5–45)
HDLC SERPL-MCNC: 32 MG/DL — LOW (ref 45–65)
HGB BLD-MCNC: 10.4 G/DL — LOW (ref 11.5–15.5)
LIPID PNL WITH DIRECT LDL SERPL: 106 MG/DL — SIGNIFICANT CHANGE UP
MCHC RBC-ENTMCNC: 27.1 PG — SIGNIFICANT CHANGE UP (ref 27–34)
MCHC RBC-ENTMCNC: 31 % — LOW (ref 32–36)
MCV RBC AUTO: 87.5 FL — SIGNIFICANT CHANGE UP (ref 80–100)
NRBC # FLD: 0 — SIGNIFICANT CHANGE UP
PLATELET # BLD AUTO: 254 K/UL — SIGNIFICANT CHANGE UP (ref 150–400)
PMV BLD: 11.4 FL — SIGNIFICANT CHANGE UP (ref 7–13)
POTASSIUM SERPL-MCNC: 4.2 MMOL/L — SIGNIFICANT CHANGE UP (ref 3.5–5.3)
POTASSIUM SERPL-SCNC: 4.2 MMOL/L — SIGNIFICANT CHANGE UP (ref 3.5–5.3)
RBC # BLD: 3.84 M/UL — SIGNIFICANT CHANGE UP (ref 3.8–5.2)
RBC # FLD: 12.6 % — SIGNIFICANT CHANGE UP (ref 10.3–14.5)
SODIUM SERPL-SCNC: 142 MMOL/L — SIGNIFICANT CHANGE UP (ref 135–145)
TRIGL SERPL-MCNC: 102 MG/DL — SIGNIFICANT CHANGE UP (ref 10–149)
WBC # BLD: 10.18 K/UL — SIGNIFICANT CHANGE UP (ref 3.8–10.5)
WBC # FLD AUTO: 10.18 K/UL — SIGNIFICANT CHANGE UP (ref 3.8–10.5)

## 2017-08-06 PROCEDURE — 99233 SBSQ HOSP IP/OBS HIGH 50: CPT

## 2017-08-06 PROCEDURE — 99232 SBSQ HOSP IP/OBS MODERATE 35: CPT

## 2017-08-06 RX ORDER — SODIUM POLYSTYRENE SULFONATE 4.1 MEQ/G
30 POWDER, FOR SUSPENSION ORAL ONCE
Qty: 0 | Refills: 0 | Status: DISCONTINUED | OUTPATIENT
Start: 2017-08-06 | End: 2017-08-06

## 2017-08-06 RX ORDER — AMIODARONE HYDROCHLORIDE 400 MG/1
200 TABLET ORAL DAILY
Qty: 0 | Refills: 0 | Status: DISCONTINUED | OUTPATIENT
Start: 2017-08-06 | End: 2017-08-07

## 2017-08-06 RX ADMIN — AMIODARONE HYDROCHLORIDE 200 MILLIGRAM(S): 400 TABLET ORAL at 12:54

## 2017-08-06 RX ADMIN — ATENOLOL 25 MILLIGRAM(S): 25 TABLET ORAL at 05:52

## 2017-08-06 RX ADMIN — APIXABAN 5 MILLIGRAM(S): 2.5 TABLET, FILM COATED ORAL at 18:41

## 2017-08-06 RX ADMIN — APIXABAN 5 MILLIGRAM(S): 2.5 TABLET, FILM COATED ORAL at 05:52

## 2017-08-06 NOTE — PROGRESS NOTE ADULT - SUBJECTIVE AND OBJECTIVE BOX
Patient is a 62y old  Female who presents with a chief complaint of Unwitnessed Fall (05 Aug 2017 12:13)      SUBJECTIVE / OVERNIGHT EVENTS: patient seen and examined by bedside , denies headache, dizziness, SOB, CP, Palpitations N/V/D, abdominal pain   Tele:sinus sonu in 50s     MEDICATIONS  (STANDING):  apixaban 5 milliGRAM(s) Oral every 12 hours  ATENolol  Tablet 25 milliGRAM(s) Oral daily  amiodarone    Tablet 200 milliGRAM(s) Oral daily    MEDICATIONS  (PRN):  acetaminophen   Tablet. 650 milliGRAM(s) Oral every 6 hours PRN mild, moderate pain      Vital Signs Last 24 Hrs  T(C): 36.6 (06 Aug 2017 14:20), Max: 36.7 (06 Aug 2017 08:55)  T(F): 97.8 (06 Aug 2017 14:20), Max: 98 (06 Aug 2017 08:55)  HR: 56 (06 Aug 2017 14:20) (50 - 60)  BP: 116/66 (06 Aug 2017 14:20) (116/66 - 141/65)  BP(mean): --  RR: 18 (06 Aug 2017 14:20) (16 - 18)  SpO2: 97% (06 Aug 2017 14:20) (97% - 100%)  CAPILLARY BLOOD GLUCOSE        I&O's Summary      PHYSICAL EXAM:  GENERAL: NAD, well-developed  HEAD:  Atraumatic, Normocephalic  EYES: EOMI, PERRLA, conjunctiva and sclera clear  NECK: Supple, No JVD  CHEST/LUNG: Clear to auscultation bilaterally; No wheeze  HEART: Regular rate and rhythm; No murmurs, rubs, or gallops  ABDOMEN: Soft, Nontender, Nondistended; Bowel sounds present  EXTREMITIES:  2+ Peripheral Pulses, No clubbing, cyanosis, or edema  PSYCH: AAOx3  NEUROLOGY: non-focal  SKIN: No rashes or lesions    LABS:                        10.4   10.18 )-----------( 254      ( 06 Aug 2017 05:39 )             33.6     08-06    142  |  104  |  13  ----------------------------<  97  4.2   |  24  |  0.62    Ca    8.9      06 Aug 2017 05:39  Mg     2.0     08-05    TPro  7.4  /  Alb  3.5  /  TBili  0.2  /  DBili  x   /  AST  14  /  ALT  11  /  AlkPhos  85  08-05    PT/INR - ( 05 Aug 2017 06:45 )   PT: 11.6 SEC;   INR: 1.03          PTT - ( 05 Aug 2017 06:45 )  PTT:30.8 SEC  CARDIAC MARKERS ( 05 Aug 2017 13:03 )  x     / < 0.06 ng/mL / 102 u/L / 2.96 ng/mL / x      CARDIAC MARKERS ( 05 Aug 2017 06:45 )  x     / < 0.06 ng/mL / 112 u/L / 3.16 ng/mL / x              RADIOLOGY & ADDITIONAL TESTS:    Imaging Personally Reviewed:    Consultant(s) Notes Reviewed:      Care Discussed with Consultants/Other Providers: Patient is a 62y old  Female who presents with a chief complaint of Unwitnessed Fall (05 Aug 2017 12:13)      SUBJECTIVE / OVERNIGHT EVENTS: patient seen and examined by bedside , denies headache, dizziness, SOB, CP, Palpitations N/V/D, abdominal pain   Tele:sinus sonu in 50s     MEDICATIONS  (STANDING):  apixaban 5 milliGRAM(s) Oral every 12 hours  ATENolol  Tablet 25 milliGRAM(s) Oral daily  amiodarone    Tablet 200 milliGRAM(s) Oral daily    MEDICATIONS  (PRN):  acetaminophen   Tablet. 650 milliGRAM(s) Oral every 6 hours PRN mild, moderate pain      Vital Signs Last 24 Hrs  T(C): 36.6 (06 Aug 2017 14:20), Max: 36.7 (06 Aug 2017 08:55)  T(F): 97.8 (06 Aug 2017 14:20), Max: 98 (06 Aug 2017 08:55)  HR: 56 (06 Aug 2017 14:20) (50 - 60)  BP: 116/66 (06 Aug 2017 14:20) (116/66 - 141/65)  BP(mean): --  RR: 18 (06 Aug 2017 14:20) (16 - 18)  SpO2: 97% (06 Aug 2017 14:20) (97% - 100%)  CAPILLARY BLOOD GLUCOSE        I&O's Summary      PHYSICAL EXAM:  GENERAL: NAD, well-developed  HEAD:  Atraumatic, Normocephalic  EYES: EOMI, PERRLA, conjunctiva and sclera clear  NECK: Supple,   CHEST/LUNG: Clear to auscultation bilaterally; No wheeze  HEART: Regular rate and rhythm; No murmurs, rubs, or gallops  ABDOMEN: Soft, Nontender, Nondistended; Bowel sounds present  EXTREMITIES:  2+ Peripheral Pulses, No clubbing, cyanosis, or edema  PSYCH: AAOx3  NEUROLOGY: non-focal  SKIN: No rashes or lesions    LABS:                        10.4   10.18 )-----------( 254      ( 06 Aug 2017 05:39 )             33.6     08-06    142  |  104  |  13  ----------------------------<  97  4.2   |  24  |  0.62    Ca    8.9      06 Aug 2017 05:39  Mg     2.0     08-05    TPro  7.4  /  Alb  3.5  /  TBili  0.2  /  DBili  x   /  AST  14  /  ALT  11  /  AlkPhos  85  08-05    PT/INR - ( 05 Aug 2017 06:45 )   PT: 11.6 SEC;   INR: 1.03          PTT - ( 05 Aug 2017 06:45 )  PTT:30.8 SEC  CARDIAC MARKERS ( 05 Aug 2017 13:03 )  x     / < 0.06 ng/mL / 102 u/L / 2.96 ng/mL / x      CARDIAC MARKERS ( 05 Aug 2017 06:45 )  x     / < 0.06 ng/mL / 112 u/L / 3.16 ng/mL / x              RADIOLOGY & ADDITIONAL TESTS:    Imaging Personally Reviewed:    Consultant(s) Notes Reviewed:  cardiology     Care Discussed with Consultants/Other Providers:

## 2017-08-06 NOTE — PROVIDER CONTACT NOTE (OTHER) - BACKGROUND
Patient admitted with syncope and collapse  patient bradycardic overnight, received beta blocker in AM

## 2017-08-06 NOTE — PROGRESS NOTE ADULT - PROBLEM SELECTOR PLAN 2
ekg/telemetry, rate controlled, con't eliquis rate controlled with atenolol and amiodarone   con't eliquis

## 2017-08-06 NOTE — PROGRESS NOTE ADULT - SUBJECTIVE AND OBJECTIVE BOX
Subjective:   	 no chest pain   no shortness of breath   no  lightheadness     MEDICATIONS:  MEDICATIONS  (STANDING):  apixaban 5 milliGRAM(s) Oral every 12 hours  ATENolol  Tablet 25 milliGRAM(s) Oral daily    MEDICATIONS  (PRN):  acetaminophen   Tablet. 650 milliGRAM(s) Oral every 6 hours PRN mild, moderate pain      LABS:	 	    CARDIAC MARKERS:  CARDIAC MARKERS ( 05 Aug 2017 13:03 )  x     / < 0.06 ng/mL / 102 u/L / 2.96 ng/mL / x      CARDIAC MARKERS ( 05 Aug 2017 06:45 )  x     / < 0.06 ng/mL / 112 u/L / 3.16 ng/mL / x                      10.4   10.18 )-----------( 254      ( 06 Aug 2017 05:39 )             33.6     08-06    142  |  104  |  13  ----------------------------<  97  4.2   |  24  |  0.62    Ca    8.9      06 Aug 2017 05:39  Mg     2.0     08-05    TPro  7.4  /  Alb  3.5  /  TBili  0.2  /  DBili  x   /  AST  14  /  ALT  11  /  AlkPhos  85  08-05    COAGS:       proBNP:   Lipid Profile:   HgA1c: Hemoglobin A1C, Whole Blood: 6.6 % (08-05 @ 13:03)    TSH: Thyroid Stimulating Hormone, Serum: 0.84 uIU/mL (08-05 @ 13:03)        PHYSICAL EXAM:  T(C): 36.7 (08-06-17 @ 08:55), Max: 36.7 (08-06-17 @ 08:55)  HR: 50 (08-06-17 @ 08:55) (50 - 60)  BP: 123/50 (08-06-17 @ 08:55) (120/61 - 141/65)  RR: 16 (08-06-17 @ 08:55) (16 - 18)  SpO2: 99% (08-06-17 @ 08:55) (98% - 100%)  Wt(kg): --  I&O's Summary    Height (cm): 152.4 (08-05 @ 12:13)  Weight (kg): 67 (08-05 @ 12:13)  BMI (kg/m2): 28.8 (08-05 @ 12:13)  BSA (m2): 1.64 (08-05 @ 12:13)      Cardiovascular: Normal S1 S2,RRR   No JVD, ii/vi systolic murmur , Peripheral pulses palpable 2+ bilaterally  Respiratory: Lungs clear to auscultation, normal effort 	  Gastrointestinal:  Soft, Non-tender, + BS	  Extremities no edema, cyanosis, clubbing B/L LE's       TELEMETRY: 	SR    ECG:  A fib 	  RADIOLOGY:    CT Head      No evidence of an acute intracranial hemorrhage or depressed calvarial   fracture.  CXR     Clear lungs. No pleural effusions or pneumothorax.    Enlarged appearing cardiac silhouette.       DIAGNOSTIC TESTING:  [ ] Echocardiogram: 10/2016      EF 75%      Nl LVS fx NL RV fx  Mild TR     [ ]  Catheterization:  [ ] Stress Test:    OTHER: 	      ASSESSMENT/PLAN: 	62y Female Simeon known to our office who is  Vanessa speaking  (info obtained via  [employer]), with a PmHx of Afib on Eliquis, HTN  Presented to [facility] ED c/o left leg pain and unwitnessed fall.      HX A fib with elevated Chads Vasc Score Life long A/C recommended  for CVA prevention c/w home Eliquis     c/w atenolol  CT head no acute pathology   EKG A fib rate controlled converted to SR   CE neg x 2   would check  Echo    tele now sr   check orthostatics   TSH Nl   L LE Doppler  pending

## 2017-08-06 NOTE — PROGRESS NOTE ADULT - PROBLEM SELECTOR PLAN 3
monitor bp, con't meds, adjust as needed BP controlled   monitor bp,   con't meds   adjust as needed

## 2017-08-06 NOTE — PROGRESS NOTE ADULT - SUBJECTIVE AND OBJECTIVE BOX
Cardiology/Vascular Medicine Inpatient Consultation Note    Patient known to me from previous admission.  Told ER to admit pt to hospitalist service.    Feels better this AM.  No complaints.  No events on telemetry,    Vital Signs Last 24 Hrs  T(C): 36.6 (06 Aug 2017 05:49), Max: 36.7 (05 Aug 2017 08:50)  T(F): 97.8 (06 Aug 2017 05:49), Max: 98.1 (05 Aug 2017 08:50)  HR: 60 (06 Aug 2017 05:49) (60 - 75)  BP: 126/63 (06 Aug 2017 05:49) (120/61 - 144/75)  BP(mean): --  RR: 18 (06 Aug 2017 05:49) (16 - 18)  SpO2: 98% (06 Aug 2017 05:49) (98% - 100%)    Appearance: Normal	  HEENT:   Normal oral mucosa, PERRL, EOMI	  Lymphatic: No lymphadenopathy  Cardiovascular: Normal S1 S2, No JVD, No murmurs, No edema  Respiratory: Lungs clear to auscultation	  Psychiatry: A & O x 3, Mood & affect appropriate  Gastrointestinal:  Soft, Non-tender, + BS	  Skin: No rashes, No ecchymoses, No cyanosis	  Neurologic: Non-focal  Extremities: Normal range of motion, No clubbing, cyanosis or edema  Vascular: Peripheral pulses palpable 2+ bilaterally    MEDICATIONS  (STANDING):  apixaban 5 milliGRAM(s) Oral every 12 hours  ATENolol  Tablet 25 milliGRAM(s) Oral daily    MEDICATIONS  (PRN):  acetaminophen   Tablet. 650 milliGRAM(s) Oral every 6 hours PRN mild, moderate pain      LABS:	 	                          11.1   9.59  )-----------( 250      ( 05 Aug 2017 06:45 )             35.6     08-05    141  |  105  |  11  ----------------------------<  134<H>  4.0   |  24  |  0.61    Ca    8.5      05 Aug 2017 06:45  Mg     2.0     08-05    TPro  7.4  /  Alb  3.5  /  TBili  0.2  /  DBili  x   /  AST  14  /  ALT  11  /  AlkPhos  85  08-05

## 2017-08-06 NOTE — PROGRESS NOTE ADULT - ASSESSMENT
61 y/o Vanessa speaking female (translation done through [employer] ID # [id number]), with a PmHx of Afib on Eliquis, HTN, is being admitted to telemetry for unwitnessed fall r/o syncope and left leg pain. 63 y/o Vanessa speaking female, with a PmHx of Afib on Eliquis, HTN, is being admitted to telemetry for unwitnessed fall r/o syncope and left leg pain.

## 2017-08-06 NOTE — PROVIDER CONTACT NOTE (OTHER) - SITUATION
Patient sinus bradycardic on telemetry monitor (currently [id number] Patient sinus bradycardic on telemetry monitor (currently [id number])

## 2017-08-06 NOTE — PROGRESS NOTE ADULT - ATTENDING COMMENTS
Patient seen and examined, agree with above assessment and plan as transcribed above.    - F/u echo  -Suspect vasovagal event in the setting of pain    Den Scott MD, [facility]  [facility], [facility]  [address] [place] [address]  PHONE:  [phone]  BEEPER : [phone]

## 2017-08-06 NOTE — PROVIDER CONTACT NOTE (OTHER) - ACTION/TREATMENT ORDERED:
Continue to monitor patient for symptomatic bradycardia  notify provider for heart rate <50 or symptomatic bradycardia

## 2017-08-06 NOTE — PROGRESS NOTE ADULT - PROBLEM SELECTOR PLAN 1
ekg/telemetry, f/u ce x 2, mg, tsh, last echo 10/4/16 was normal, ct head neg, cta chest neg, cardio c/s Dr. Whatley Pt denies dizziness , lightheadedness  at this time   Telemetry: no acute events    ce x 2 negative  last echo 10/4/16 was normal, ct head neg, cta chest neg,   cardio c/s Dr. Whatley appreciated, recommend f/u Echo, if unremarkable, then Dc home

## 2017-08-07 VITALS — OXYGEN SATURATION: 99 % | RESPIRATION RATE: 15 BRPM | TEMPERATURE: 98 F

## 2017-08-07 LAB
BUN SERPL-MCNC: 12 MG/DL — SIGNIFICANT CHANGE UP (ref 7–23)
CALCIUM SERPL-MCNC: 9 MG/DL — SIGNIFICANT CHANGE UP (ref 8.4–10.5)
CHLORIDE SERPL-SCNC: 103 MMOL/L — SIGNIFICANT CHANGE UP (ref 98–107)
CO2 SERPL-SCNC: 24 MMOL/L — SIGNIFICANT CHANGE UP (ref 22–31)
CREAT SERPL-MCNC: 0.63 MG/DL — SIGNIFICANT CHANGE UP (ref 0.5–1.3)
GLUCOSE SERPL-MCNC: 112 MG/DL — HIGH (ref 70–99)
HCT VFR BLD CALC: 35 % — SIGNIFICANT CHANGE UP (ref 34.5–45)
HGB BLD-MCNC: 11 G/DL — LOW (ref 11.5–15.5)
MCHC RBC-ENTMCNC: 27 PG — SIGNIFICANT CHANGE UP (ref 27–34)
MCHC RBC-ENTMCNC: 31.4 % — LOW (ref 32–36)
MCV RBC AUTO: 85.8 FL — SIGNIFICANT CHANGE UP (ref 80–100)
NRBC # FLD: 0 — SIGNIFICANT CHANGE UP
PLATELET # BLD AUTO: 253 K/UL — SIGNIFICANT CHANGE UP (ref 150–400)
PMV BLD: 11.1 FL — SIGNIFICANT CHANGE UP (ref 7–13)
POTASSIUM SERPL-MCNC: 4.1 MMOL/L — SIGNIFICANT CHANGE UP (ref 3.5–5.3)
POTASSIUM SERPL-SCNC: 4.1 MMOL/L — SIGNIFICANT CHANGE UP (ref 3.5–5.3)
RBC # BLD: 4.08 M/UL — SIGNIFICANT CHANGE UP (ref 3.8–5.2)
RBC # FLD: 12.3 % — SIGNIFICANT CHANGE UP (ref 10.3–14.5)
SODIUM SERPL-SCNC: 140 MMOL/L — SIGNIFICANT CHANGE UP (ref 135–145)
WBC # BLD: 9.81 K/UL — SIGNIFICANT CHANGE UP (ref 3.8–10.5)
WBC # FLD AUTO: 9.81 K/UL — SIGNIFICANT CHANGE UP (ref 3.8–10.5)

## 2017-08-07 PROCEDURE — 93971 EXTREMITY STUDY: CPT | Mod: 26,LT

## 2017-08-07 PROCEDURE — 93306 TTE W/DOPPLER COMPLETE: CPT | Mod: 26

## 2017-08-07 PROCEDURE — 99232 SBSQ HOSP IP/OBS MODERATE 35: CPT

## 2017-08-07 PROCEDURE — 99239 HOSP IP/OBS DSCHRG MGMT >30: CPT

## 2017-08-07 RX ADMIN — ATENOLOL 25 MILLIGRAM(S): 25 TABLET ORAL at 07:21

## 2017-08-07 RX ADMIN — AMIODARONE HYDROCHLORIDE 200 MILLIGRAM(S): 400 TABLET ORAL at 07:21

## 2017-08-07 RX ADMIN — APIXABAN 5 MILLIGRAM(S): 2.5 TABLET, FILM COATED ORAL at 17:17

## 2017-08-07 RX ADMIN — APIXABAN 5 MILLIGRAM(S): 2.5 TABLET, FILM COATED ORAL at 07:20

## 2017-08-07 NOTE — PROGRESS NOTE ADULT - PROBLEM SELECTOR PLAN 1
No events on telemetry  f/u Echo, if unremarkable, then Dc home  Cardio recs appreciated: suspect vasovagal syncope  F/U orthostatics No events on telemetry  f/u Echo, if unremarkable, then Dc home  Cardio recs appreciated: suspect vasovagal syncope  orthostatics negative

## 2017-08-07 NOTE — PROGRESS NOTE ADULT - PROBLEM SELECTOR PLAN 2
rate controlled with atenolol and amiodarone   con't eliquis rate controlled with atenolol. DC amiodarone: Clarified with outpt pharmacy that patient not taking this medication.  con't [facility] following for cardiology

## 2017-08-07 NOTE — DISCHARGE NOTE ADULT - HOSPITAL COURSE
61 y/o Vanessa speaking female (translation done through [employer] ID # [id number]), with a PmHx of Afib on Eliquis, HTN, presented to [facility] ED c/o left leg pain and unwitnessed fall. Pt states at about 1900hrs last night she had gone to a wedding locally and returned back to her house at around 2300hrs. She states at around 0400hrs, she had gotten up from sleep c/o left leg/foot cramping. She states she tried to work out the pain and then got up to the bathroom and then she must have passed out. She states her  found her on the floor and then helped her up. She states she thinks she was unresponsive for about 1 minute. She denies any fever, chills, chest pain, HA, dizziness, blurred vision, n/c, sob, recent travel. The only complaint she has is left leg pain. She appears comfortable at this time. She is being admitted to telemetry for unwitnessed fall.    Hospital course:  EKG: Afib @ 74, no changes  CE x 2 neg               Glucose: 134	HbA1C: 6.6%	TSH: 0.84  CT Head - no evidence of an acute intracranial hemorrhage or depressed calvarial fracture.  CXR: Clear lungs.     Pt seen by cardiology, CE negative, EKG with no ischemic change. Pt intermittently going into afib on telemetry. Pt continue on ome eliquis and BB for rate control. TTE *****. LLE doppler ****. Orthostatic ***. Syncope episode likely associated with vasovagal episode****    Pt cleared for discharge 63 y/o Vanessa speaking female (translation done through [employer] ID # [id number]), with a PmHx of Afib on Eliquis, HTN, presented to [facility] ED c/o left leg pain and unwitnessed fall. Pt states at about 1900hrs last night she had gone to a wedding locally and returned back to her house at around 2300hrs. She states at around 0400hrs, she had gotten up from sleep c/o left leg/foot cramping. She states she tried to work out the pain and then got up to the bathroom and then she must have passed out. She states her  found her on the floor and then helped her up. She states she thinks she was unresponsive for about 1 minute. She denies any fever, chills, chest pain, HA, dizziness, blurred vision, n/c, sob, recent travel. The only complaint she has is left leg pain. She appears comfortable at this time. She is being admitted to telemetry for unwitnessed fall.    Hospital course:  EKG: Afib @ 74, no changes  CE x 2 neg               Glucose: 134	HbA1C: 6.6%	TSH: 0.84  CT Head - no evidence of an acute intracranial hemorrhage or depressed calvarial fracture.  CXR: Clear lungs.     Pt seen by cardiology, CE negative, EKG with no ischemic change. Pt intermittently going into afib on telemetry. Pt continue on ome eliquis and BB for rate control. TTE with normal EF and no wall motion abnormality. LLE doppler negative for DVT. Orthostatic negative. Syncope episode likely associated with vasovagal episode. Telemetry noted for 2.4 sec pause but pt asymptomatic, case discuss with attending and cards continue current medication and clear for discharge 63 y/o Vanessa speaking female (translation done through [employer] ID # [id number]), with a PmHx of Afib on Eliquis, HTN, presented to [facility] ED c/o left leg pain and unwitnessed fall. Pt states at about 1900hrs last night she had gone to a wedding locally and returned back to her house at around 2300hrs. She states at around 0400hrs, she had gotten up from sleep c/o left leg/foot cramping. She states she tried to work out the pain and then got up to the bathroom and then she must have passed out. She states her  found her on the floor and then helped her up. She states she thinks she was unresponsive for about 1 minute. She denies any fever, chills, chest pain, HA, dizziness, blurred vision, n/c, sob, recent travel. The only complaint she has is left leg pain. She appears comfortable at this time. She is being admitted to telemetry for unwitnessed fall.    Hospital course:  EKG: Afib @ 74, no changes  CE x 2 neg               Glucose: 134	HbA1C: 6.6%	TSH: 0.84  CT Head - no evidence of an acute intracranial hemorrhage or depressed calvarial fracture.  CXR: Clear lungs.     Pt seen by cardiology, CE negative, EKG with no ischemic change. Pt intermittently going into afib on telemetry. Pt continue on eliquis and BB for rate control. TTE with normal EF and no wall motion abnormality. LLE doppler negative for DVT. Orthostatic negative. Syncope episode likely associated with vasovagal episode. Telemetry noted for 2.4 sec pause but pt asymptomatic, case discuss with attending and cards continue current medication and clear for discharge with outpatient followup

## 2017-08-07 NOTE — DISCHARGE NOTE ADULT - PLAN OF CARE
prevent future episode please follow up with your cardiologist or Dr Yarbrough in 2-3 weeks. continue home medication. follow up with your PMD in 2-3 weeks please follow up with your cardiologist or Dr Yarbrough in 2-3 weeks. continue home medication your ultrasound of leg is normal. take tylenol prn for pain continue home medication

## 2017-08-07 NOTE — DISCHARGE NOTE ADULT - PROVIDER TOKENS
TOKEN:'[id number]',TOKEN:'[id number]',FREE:[LAST:[Dr Reis],PHONE:[phone]],FAX:[(   )    -]]

## 2017-08-07 NOTE — PROGRESS NOTE ADULT - PROBLEM SELECTOR PLAN 7
HbAic is 6.6  f/u repeat as outpat, diet control   lipid profile wnl
HbAic is 6.6  f/u repeat as outpat, diet control   lipid profile wnl

## 2017-08-07 NOTE — PROGRESS NOTE ADULT - SUBJECTIVE AND OBJECTIVE BOX
Subjective: No events  	  MEDICATIONS:  MEDICATIONS  (STANDING):  apixaban 5 milliGRAM(s) Oral every 12 hours  ATENolol  Tablet 25 milliGRAM(s) Oral daily  amiodarone    Tablet 200 milliGRAM(s) Oral daily      LABS:	 	    CARDIAC MARKERS:  CARDIAC MARKERS ( 05 Aug 2017 13:03 )  x     / < 0.06 ng/mL / 102 u/L / 2.96 ng/mL / x      CARDIAC MARKERS ( 05 Aug 2017 06:45 )  x     / < 0.06 ng/mL / 112 u/L / 3.16 ng/mL / x                                    11.0   9.81  )-----------( 253      ( 07 Aug 2017 05:42 )             35.0     08-07    140  |  103  |  12  ----------------------------<  112<H>  4.1   |  24  |  0.63    Ca    9.0      07 Aug 2017 05:42  Mg     2.0     08-05      proBNP:   Lipid Profile:   HgA1c:   TSH:       PHYSICAL EXAM:  T(C): 36.6 (08-07-17 @ 07:19), Max: 36.6 (08-06-17 @ 14:20)  HR: 60 (08-07-17 @ 07:19) (56 - 62)  BP: 123/72 (08-07-17 @ 07:19) (116/66 - 141/62)  RR: 18 (08-07-17 @ 07:19) (18 - 18)  SpO2: 98% (08-07-17 @ 07:19) (97% - 98%)  Wt(kg): --  I&O's Summary    Heart: normal S1, S2, RRR, no m/r/g  Lungs: CTA b/l  Abd: soft nT, nD  Ext: no edema         TELEMETRY: SR/SB	    ECG:  	  RADIOLOGY:   DIAGNOSTIC TESTING:  [ ] Echocardiogram: pending   [ ]  Catheterization:  [ ] Stress Test:    OTHER: 	      ASSESSMENT/PLAN: 	62y Femael with h/o of Afib on Eliquis, HTN  Presented to [facility] ED c/o left leg pain and unwitnessed fall.    - f/u TTE  -Continue with jessica licea for cva prevention  -Would continue with rate control strategy for asymptomatic PAF  -Given her age and rate control strategy being implemented, would hold amio unless otherwise indicated

## 2017-08-07 NOTE — PROGRESS NOTE ADULT - ASSESSMENT
61 y/o Vanessa speaking female, with a PmHx of Afib on Eliquis, HTN, is being admitted to telemetry for unwitnessed fall r/o syncope and left leg pain.

## 2017-08-07 NOTE — PROGRESS NOTE ADULT - SUBJECTIVE AND OBJECTIVE BOX
Patient is a 62y old  Female who presents with a chief complaint of Unwitnessed Fall (07 Aug 2017 11:35)      SUBJECTIVE / OVERNIGHT EVENTS: No acute events overnight. No new complaints    MEDICATIONS  (STANDING):  apixaban 5 milliGRAM(s) Oral every 12 hours  ATENolol  Tablet 25 milliGRAM(s) Oral daily  amiodarone    Tablet 200 milliGRAM(s) Oral daily    MEDICATIONS  (PRN):  acetaminophen   Tablet. 650 milliGRAM(s) Oral every 6 hours PRN mild, moderate pain      T(C): 36.6 (08-07-17 @ 07:19), Max: 36.6 (08-06-17 @ 14:20)  HR: 60 (08-07-17 @ 07:19) (56 - 62)  BP: 123/72 (08-07-17 @ 07:19) (116/66 - 141/62)  RR: 18 (08-07-17 @ 07:19) (18 - 18)  SpO2: 98% (08-07-17 @ 07:19) (97% - 98%)  CAPILLARY BLOOD GLUCOSE        I&O's Summary      PHYSICAL EXAM:  GENERAL: NAD, well-developed  EYES: sclera clear b/l  CHEST/LUNG: Clear to auscultation bilaterally; No wheezing  HEART: Regular rate and rhythm; No murmurs, rubs, or gallops  ABDOMEN: Soft, Nontender, Nondistended; Bowel sounds present  EXTREMITIES:  no clubbing, cyanosis, or edema  PSYCH: AAOx3  NEUROLOGY: non-focal  SKIN: No rashes or lesions    LABS:                        11.0   9.81  )-----------( 253      ( 07 Aug 2017 05:42 )             35.0     08-07    140  |  103  |  12  ----------------------------<  112<H>  4.1   |  24  |  0.63    Ca    9.0      07 Aug 2017 05:42  Mg     2.0     08-05        CARDIAC MARKERS ( 05 Aug 2017 13:03 )  x     / < 0.06 ng/mL / 102 u/L / 2.96 ng/mL / x              RADIOLOGY & ADDITIONAL TESTS:

## 2017-08-07 NOTE — DISCHARGE NOTE ADULT - CARE PROVIDER_API CALL
Yakelin Monaco (), Internal Medicine  [id number] [place] [address]  Phone: [phone]  Fax: [phone]    Tiffanie Yarbrough (MD), Cardiovascular Disease; Internal Medicine; Interventional Cardiology  [address] [place] [address]  Phone: [phone]  Fax: [phone]    Dr Reis,   Phone: [phone]  Fax: (   )    -

## 2017-08-07 NOTE — DISCHARGE NOTE ADULT - INSTRUCTIONS
please follow up with your cardiologist or Dr Yarbrough in 2-3 weeks. continue home medication. follow up with your PMD in 2-3 weeks

## 2017-08-07 NOTE — DISCHARGE NOTE ADULT - MEDICATION SUMMARY - MEDICATIONS TO TAKE
I will START or STAY ON the medications listed below when I get home from the hospital:    Tylenol 325 mg oral tablet  -- 2 tab(s) by mouth every 4 hours, As Needed  -- Indication: For Pain    apixaban 5 mg oral tablet  -- 1 tab(s) by mouth 2 times a day  -- Indication: For Afib    atenolol 25 mg oral tablet  -- 1 tab(s) by mouth once a day  -- Indication: For Afib

## 2017-08-07 NOTE — DISCHARGE NOTE ADULT - CARE PROVIDERS DIRECT ADDRESSES
[email or web address],DirectAddress_Unknown,DirectAddress_Unknown

## 2018-07-21 NOTE — ED PROVIDER NOTE - CPE EDP MUSC NORM
Pt requested metoprolol refilled. Sent it to pharmacy for 1m and asked him to call our office to talk to Dr Velazquez's nurse so that can send refills  
normal...

## 2025-03-03 NOTE — ED PROVIDER NOTE - PROGRESS NOTE3
Get me your paperwork and we'll plan on getting you on half-time on 3/13/25.      Can try PT to help with planning for work.    Can increase L-arginine to 1500 mg/day if needed.    Let me know if you decide you want to see neurology.    Contact us or return if questions or concerns.    Have a nice day!    Dr. Jolley    
DISPLAY PLAN FREE TEXT
DISPLAY PLAN FREE TEXT
Stable.

## 2025-07-03 ENCOUNTER — EMERGENCY (EMERGENCY)
Facility: HOSPITAL | Age: 70
LOS: 1 days | End: 2025-07-03
Attending: EMERGENCY MEDICINE | Admitting: EMERGENCY MEDICINE
Payer: MEDICARE

## 2025-07-03 VITALS
TEMPERATURE: 98 F | HEART RATE: 60 BPM | OXYGEN SATURATION: 96 % | RESPIRATION RATE: 17 BRPM | SYSTOLIC BLOOD PRESSURE: 144 MMHG | WEIGHT: 156.09 LBS | HEIGHT: 63 IN | DIASTOLIC BLOOD PRESSURE: 67 MMHG

## 2025-07-03 LAB
ADD ON TEST-SPECIMEN IN LAB: SIGNIFICANT CHANGE UP
ALBUMIN SERPL ELPH-MCNC: 3.6 G/DL — SIGNIFICANT CHANGE UP (ref 3.3–5)
ALP SERPL-CCNC: 85 U/L — SIGNIFICANT CHANGE UP (ref 40–120)
ALT FLD-CCNC: 7 U/L — SIGNIFICANT CHANGE UP (ref 4–33)
ANION GAP SERPL CALC-SCNC: 13 MMOL/L — SIGNIFICANT CHANGE UP (ref 7–14)
APTT BLD: 32.9 SEC — SIGNIFICANT CHANGE UP (ref 26.1–36.8)
AST SERPL-CCNC: 13 U/L — SIGNIFICANT CHANGE UP (ref 4–32)
BASOPHILS # BLD AUTO: 0.03 K/UL — SIGNIFICANT CHANGE UP (ref 0–0.2)
BASOPHILS NFR BLD AUTO: 0.3 % — SIGNIFICANT CHANGE UP (ref 0–2)
BILIRUB SERPL-MCNC: 0.2 MG/DL — SIGNIFICANT CHANGE UP (ref 0.2–1.2)
BUN SERPL-MCNC: 18 MG/DL — SIGNIFICANT CHANGE UP (ref 7–23)
CALCIUM SERPL-MCNC: 9.2 MG/DL — SIGNIFICANT CHANGE UP (ref 8.4–10.5)
CHLORIDE SERPL-SCNC: 105 MMOL/L — SIGNIFICANT CHANGE UP (ref 98–107)
CO2 SERPL-SCNC: 22 MMOL/L — SIGNIFICANT CHANGE UP (ref 22–31)
CREAT SERPL-MCNC: 1.17 MG/DL — SIGNIFICANT CHANGE UP (ref 0.5–1.3)
EGFR: 51 ML/MIN/1.73M2 — LOW
EGFR: 51 ML/MIN/1.73M2 — LOW
EOSINOPHIL # BLD AUTO: 0.14 K/UL — SIGNIFICANT CHANGE UP (ref 0–0.5)
EOSINOPHIL NFR BLD AUTO: 1.4 % — SIGNIFICANT CHANGE UP (ref 0–6)
GLUCOSE SERPL-MCNC: 133 MG/DL — HIGH (ref 70–99)
HCT VFR BLD CALC: 29.3 % — LOW (ref 34.5–45)
HGB BLD-MCNC: 9.3 G/DL — LOW (ref 11.5–15.5)
IMM GRANULOCYTES # BLD AUTO: 0.02 K/UL — SIGNIFICANT CHANGE UP (ref 0–0.07)
IMM GRANULOCYTES NFR BLD AUTO: 0.2 % — SIGNIFICANT CHANGE UP (ref 0–0.9)
INR BLD: 1.31 RATIO — HIGH (ref 0.85–1.16)
LYMPHOCYTES # BLD AUTO: 3.23 K/UL — SIGNIFICANT CHANGE UP (ref 1–3.3)
LYMPHOCYTES NFR BLD AUTO: 33.4 % — SIGNIFICANT CHANGE UP (ref 13–44)
MCHC RBC-ENTMCNC: 26.7 PG — LOW (ref 27–34)
MCHC RBC-ENTMCNC: 31.7 G/DL — LOW (ref 32–36)
MCV RBC AUTO: 84.2 FL — SIGNIFICANT CHANGE UP (ref 80–100)
MONOCYTES # BLD AUTO: 0.63 K/UL — SIGNIFICANT CHANGE UP (ref 0–0.9)
MONOCYTES NFR BLD AUTO: 6.5 % — SIGNIFICANT CHANGE UP (ref 2–14)
NEUTROPHILS # BLD AUTO: 5.63 K/UL — SIGNIFICANT CHANGE UP (ref 1.8–7.4)
NEUTROPHILS NFR BLD AUTO: 58.2 % — SIGNIFICANT CHANGE UP (ref 43–77)
NRBC # BLD AUTO: 0 K/UL — SIGNIFICANT CHANGE UP (ref 0–0)
NRBC # FLD: 0 K/UL — SIGNIFICANT CHANGE UP (ref 0–0)
NRBC BLD AUTO-RTO: 0 /100 WBCS — SIGNIFICANT CHANGE UP (ref 0–0)
PLATELET # BLD AUTO: 261 K/UL — SIGNIFICANT CHANGE UP (ref 150–400)
PMV BLD: 11.3 FL — SIGNIFICANT CHANGE UP (ref 7–13)
POTASSIUM SERPL-MCNC: 4.4 MMOL/L — SIGNIFICANT CHANGE UP (ref 3.5–5.3)
POTASSIUM SERPL-SCNC: 4.4 MMOL/L — SIGNIFICANT CHANGE UP (ref 3.5–5.3)
PROT SERPL-MCNC: 7.3 G/DL — SIGNIFICANT CHANGE UP (ref 6–8.3)
PROTHROM AB SERPL-ACNC: 15.5 SEC — HIGH (ref 9.9–13.4)
RBC # BLD: 3.48 M/UL — LOW (ref 3.8–5.2)
RBC # FLD: 13.2 % — SIGNIFICANT CHANGE UP (ref 10.3–14.5)
SODIUM SERPL-SCNC: 140 MMOL/L — SIGNIFICANT CHANGE UP (ref 135–145)
TROPONIN T, HIGH SENSITIVITY RESULT: 10 NG/L — SIGNIFICANT CHANGE UP
TROPONIN T, HIGH SENSITIVITY RESULT: 10 NG/L — SIGNIFICANT CHANGE UP
WBC # BLD: 9.68 K/UL — SIGNIFICANT CHANGE UP (ref 3.8–10.5)
WBC # FLD AUTO: 9.68 K/UL — SIGNIFICANT CHANGE UP (ref 3.8–10.5)

## 2025-07-03 PROCEDURE — 71046 X-RAY EXAM CHEST 2 VIEWS: CPT | Mod: 26

## 2025-07-03 PROCEDURE — 93010 ELECTROCARDIOGRAM REPORT: CPT

## 2025-07-03 PROCEDURE — 99223 1ST HOSP IP/OBS HIGH 75: CPT

## 2025-07-03 RX ORDER — DEXTROSE 50 % IN WATER 50 %
25 SYRINGE (ML) INTRAVENOUS ONCE
Refills: 0 | Status: DISCONTINUED | OUTPATIENT
Start: 2025-07-03 | End: 2025-07-07

## 2025-07-03 RX ORDER — LISINOPRIL 30 MG/1
25 TABLET ORAL DAILY
Refills: 0 | Status: DISCONTINUED | OUTPATIENT
Start: 2025-07-03 | End: 2025-07-07

## 2025-07-03 RX ORDER — INSULIN LISPRO 100 U/ML
INJECTION, SOLUTION INTRAVENOUS; SUBCUTANEOUS AT BEDTIME
Refills: 0 | Status: DISCONTINUED | OUTPATIENT
Start: 2025-07-03 | End: 2025-07-07

## 2025-07-03 RX ORDER — DEXTROSE 50 % IN WATER 50 %
12.5 SYRINGE (ML) INTRAVENOUS ONCE
Refills: 0 | Status: DISCONTINUED | OUTPATIENT
Start: 2025-07-03 | End: 2025-07-07

## 2025-07-03 RX ORDER — APIXABAN 2.5 MG/1
5 TABLET, FILM COATED ORAL EVERY 12 HOURS
Refills: 0 | Status: DISCONTINUED | OUTPATIENT
Start: 2025-07-03 | End: 2025-07-07

## 2025-07-03 RX ORDER — SODIUM CHLORIDE 9 G/1000ML
1000 INJECTION, SOLUTION INTRAVENOUS
Refills: 0 | Status: DISCONTINUED | OUTPATIENT
Start: 2025-07-03 | End: 2025-07-07

## 2025-07-03 RX ORDER — GLUCAGON 3 MG/1
1 POWDER NASAL ONCE
Refills: 0 | Status: DISCONTINUED | OUTPATIENT
Start: 2025-07-03 | End: 2025-07-07

## 2025-07-03 RX ORDER — LOSARTAN POTASSIUM 100 MG/1
50 TABLET, FILM COATED ORAL ONCE
Refills: 0 | Status: COMPLETED | OUTPATIENT
Start: 2025-07-03 | End: 2025-07-03

## 2025-07-03 RX ORDER — INSULIN LISPRO 100 U/ML
INJECTION, SOLUTION INTRAVENOUS; SUBCUTANEOUS
Refills: 0 | Status: DISCONTINUED | OUTPATIENT
Start: 2025-07-03 | End: 2025-07-07

## 2025-07-03 RX ORDER — ROSUVASTATIN CALCIUM 5 MG/1
20 TABLET, FILM COATED ORAL AT BEDTIME
Refills: 0 | Status: DISCONTINUED | OUTPATIENT
Start: 2025-07-03 | End: 2025-07-07

## 2025-07-03 RX ORDER — DEXTROSE 50 % IN WATER 50 %
15 SYRINGE (ML) INTRAVENOUS ONCE
Refills: 0 | Status: DISCONTINUED | OUTPATIENT
Start: 2025-07-03 | End: 2025-07-07

## 2025-07-03 RX ORDER — LOSARTAN POTASSIUM 100 MG/1
50 TABLET, FILM COATED ORAL DAILY
Refills: 0 | Status: DISCONTINUED | OUTPATIENT
Start: 2025-07-03 | End: 2025-07-07

## 2025-07-03 RX ADMIN — ROSUVASTATIN CALCIUM 20 MILLIGRAM(S): 5 TABLET, FILM COATED ORAL at 22:54

## 2025-07-03 RX ADMIN — APIXABAN 5 MILLIGRAM(S): 2.5 TABLET, FILM COATED ORAL at 17:59

## 2025-07-03 RX ADMIN — LOSARTAN POTASSIUM 50 MILLIGRAM(S): 100 TABLET, FILM COATED ORAL at 22:54

## 2025-07-03 RX ADMIN — LOSARTAN POTASSIUM 50 MILLIGRAM(S): 100 TABLET, FILM COATED ORAL at 17:59

## 2025-07-03 NOTE — ED ADULT TRIAGE NOTE - MODE OF ARRIVAL
"Saumya Braun presented for a  Medicare AWV and comprehensive Health Risk Assessment today. The following components were reviewed and updated:    · Medical history  · Family History  · Social history  · Allergies and Current Medications  · Health Risk Assessment  · Health Maintenance  · Care Team     ** See Completed Assessments for Annual Wellness Visit within the encounter summary.**       The following assessments were completed:  · Living Situation  · CAGE  · Depression Screening  · Timed Get Up and Go  · Whisper Test  · Cognitive Function Screening  · Nutrition Screening  · ADL Screening  · PAQ Screening    Vitals:    07/30/19 1119   BP: (!) 122/59   Pulse: 62   Temp: 98 °F (36.7 °C)   TempSrc: Oral   Weight: 60.8 kg (134 lb)   Height: 5' 1" (1.549 m)     Body mass index is 25.32 kg/m².  Physical Exam   Constitutional: She is oriented to person, place, and time. She appears well-developed and well-nourished. No distress.   HENT:   Head: Normocephalic and atraumatic.   Eyes: Pupils are equal, round, and reactive to light. EOM are normal.   Neck: Normal range of motion. Neck supple.   Cardiovascular: Normal rate and regular rhythm.   Pulses:       Dorsalis pedis pulses are 2+ on the right side, and 2+ on the left side.   Pulmonary/Chest: Effort normal and breath sounds normal.   Musculoskeletal: Normal range of motion.        Right foot: There is no deformity.        Left foot: There is no deformity.   Feet:   Right Foot:   Protective Sensation: 4 sites tested. 4 sites sensed.   Skin Integrity: Negative for ulcer, blister, skin breakdown or erythema.   Left Foot:   Protective Sensation: 4 sites tested. 4 sites sensed.   Skin Integrity: Negative for ulcer, blister, skin breakdown or erythema.   Neurological: She is alert and oriented to person, place, and time.   Skin: Skin is warm and dry. No rash noted.   Psychiatric: She has a normal mood and affect. Judgment normal.   Nursing note and vitals reviewed.    "     Diagnoses and health risks identified today and associated recommendations/orders:    1. Hypertension associated with diabetes  Stable and controlled on ziac  Continue medication as prescribed  Continue current treatment plan as previously prescribed with your PCP      2. Type 2 diabetes mellitus without complication, without long-term current use of insulin  Stable and controlled on glipizide  Continue medication as prescribed  Continue current treatment plan as previously prescribed with your PCP      3. Hyperlipidemia, unspecified hyperlipidemia type  Stable and controlled on simvastatin  Continue medication as prescribed  Continue current treatment plan as previously prescribed with your PCP      4. Nonexudative age-related macular degeneration, bilateral, intermediate dry stage  [id number] with Ophthalmology    5. NS (nuclear sclerosis), bilateral  [id number] with Ophthalmology    6. Retinitis pigmentosa, both eyes  Shwdly-iibmzv-id with Ophthalmology      Provided Saumya with a 5-10 year written screening schedule and personal prevention plan. Recommendations were developed using the USPSTF age appropriate recommendations. Education, counseling, and referrals were provided as needed. After Visit Summary printed and given to patient which includes a list of additional screenings\tests needed.    No follow-ups on file.    Shiva Medrano NP   [employer]

## 2025-07-03 NOTE — ED PROVIDER NOTE - ATTENDING CONTRIBUTION TO CARE
70-year-old female with a history of A-fib on Eliquis, hypertension, diabetes, who presents with intermittent chest pain for the past week assoc with nausea. No back pain, sob, numbness/tingling/weakness to ext, cough, fever. Pt overall well appearing, has not had recent stress or echo. Also reports one episode of syncope while praying at temple 1 week ago. No head strike, denies headache. EKG NSR w/o acute ischemia, plan for labs, cxr, tele, cdu for stress

## 2025-07-03 NOTE — ED PROVIDER NOTE - PROGRESS NOTE DETAILS
Labs showed no leukocytosis. Mild anemia at 9.3. No evidence of severe electrolyte abnormalities, liver disease, or renal disorder. No severe coagulopathy. 10 troponin.   CXR showed no acute findings. Results discussed with patient and family at bedside.   Offered CDU observation for further cardiac testing. Amendable to plan.

## 2025-07-03 NOTE — ED ADULT TRIAGE NOTE - CHIEF COMPLAINT QUOTE
Pt c/o chest pain x1 week after syncopal episode. Pt denies SOB, palpitations, dizziness, n/v/d, Hx of a fib, HTN.

## 2025-07-03 NOTE — ED CDU PROVIDER INITIAL DAY NOTE - NSICDXNOPASTSURGICALHX_GEN_ALL_ED
<-- Click to add NO significant Past Surgical History Urinary Tract Infection in Women    WHAT YOU NEED TO KNOW:    A urinary tract infection (UTI) is caused by bacteria that get inside your urinary tract. Most bacteria that enter your urinary tract come out when you urinate. If the bacteria stay in your urinary tract, you may get an infection. Your urinary tract includes your kidneys, ureters, bladder, and urethra. Urine is made in your kidneys, and it flows from the ureters to the bladder. Urine leaves the bladder through the urethra. A UTI is more common in your lower urinary tract, which includes your bladder and urethra.     Female Urinary System         DISCHARGE INSTRUCTIONS:    Return to the emergency department if:   •You are urinating very little or not at all.      •You have a high fever with shaking chills.       •You have side or back pain that gets worse.      Call your doctor if:   •You have a fever.      •You do not feel better after 2 days of taking antibiotics.      •You are vomiting.       •You have questions or concerns about your condition or care.      Medicines:   •Antibiotics help fight a bacterial infection. If you have UTIs often (called recurrent UTIs), you may be given antibiotics to take regularly. You will be given directions for when and how to use antibiotics. The goal is to prevent UTIs but not cause antibiotic resistance by using antibiotics too often.      •Medicines may be given to decrease pain and burning when you urinate. They will also help decrease the feeling that you need to urinate often. These medicines will make your urine orange or red.      •Take your medicine as directed. Contact your healthcare provider if you think your medicine is not helping or if you have side effects. Tell him or her if you are allergic to any medicine. Keep a list of the medicines, vitamins, and herbs you take. Include the amounts, and when and why you take them. Bring the list or the pill bottles to follow-up visits. Carry your medicine list with you in case of an emergency.      Follow up with your doctor as directed: Write down your questions so you remember to ask them during your visits.     Prevent another UTI:   •Empty your bladder often. Urinate and empty your bladder as soon as you feel the need. Do not hold your urine for long periods of time.      •Wipe from front to back after you urinate or have a bowel movement. This will help prevent germs from getting into your urinary tract through your urethra.      •Drink liquids as directed. Ask how much liquid to drink each day and which liquids are best for you. You may need to drink more liquids than usual to help flush out the bacteria. Do not drink alcohol, caffeine, or citrus juices. These can irritate your bladder and increase your symptoms. Your healthcare provider may recommend cranberry juice to help prevent a UTI.      •Urinate after you have sex. This can help flush out bacteria passed during sex.      •Do not douche or use feminine deodorants. These can change the chemical balance in your vagina.      •Change sanitary pads or tampons often. This will help prevent germs from getting into your urinary tract.       •Talk to your healthcare provider about your birth control method. You may need to change your method if it is increasing your risk for UTIs.      •Wear cotton underwear and clothes that are loose. Tight pants and nylon underwear can trap moisture and cause bacteria to grow.      •Vaginal estrogen may be recommended. This medicine helps prevent UTIs in women who have gone through menopause or are in henok-menopause.      •Do pelvic muscle exercises often. Pelvic muscle exercises may help you start and stop urinating. Strong pelvic muscles may help you empty your bladder easier. Squeeze these muscles tightly for 5 seconds like you are trying to hold back urine. Then relax for 5 seconds. Gradually work up to squeezing for 10 seconds. Do 3 sets of 15 repetitions a day, or as directed.

## 2025-07-03 NOTE — ED ADULT NURSE NOTE - OBJECTIVE STATEMENT
C/O chest pain and syncopal episode lasting 2 minutes yesterday. Pt A&Ox4. No SOB, N/V, fevers. Respirations even, unlabored on room air. 20G IV placed right AC, labs drawn and sent.

## 2025-07-03 NOTE — ED PROVIDER NOTE - CLINICAL SUMMARY MEDICAL DECISION MAKING FREE TEXT BOX
70-year-old female with a history of A-fib on Eliquis, hypertension, diabetes, who presents with intermittent chest pain for the past week.  Had a syncopal episode 1 week ago, where developed lightheadedness, left-sided chest pressure while sitting in temple and then lost consciousness. Denies head trauma. Unsure duration of LOC but woke up feeling nauseated and sweaty. No further episodes of syncope. Now complains of intermittent chest pressure Denies shortness of breath. 70-year-old female with a history of A-fib on Eliquis, hypertension, diabetes, who presents with intermittent chest pain for the past week.  Had a syncopal episode 1 week ago, where developed lightheadedness, left-sided chest pressure while sitting in temple and then lost consciousness. Denies head trauma. Unsure duration of LOC but woke up feeling nauseated and sweaty. No further episodes of syncope. Now complains of intermittent chest pressure lasting 1-2 minutes associated with nausea, occurring at rest and with activity. Denies shortness of breath. On exam, afebrile, no tachycardia, saturating well on RA, /67. RRR. CTAB. No edema. Considered ACS, anemia, electrolyte abnormalities. Low suspicion for PE, PNA, PTX, dissection. EKG, labs, and CXR ordered. 70-year-old female with a history of A-fib on Eliquis, hypertension, diabetes, who presents with intermittent chest pain for the past week.  Had a syncopal episode 1 week ago, where developed lightheadedness, left-sided chest pressure while sitting in temple and then lost consciousness. Denies head trauma. Unsure duration of LOC but woke up feeling nauseated and sweaty. No further episodes of syncope. Now complains of intermittent chest pressure lasting 1-2 minutes associated with nausea, occurring at rest and with activity. Denies shortness of breath. On exam, afebrile, no tachycardia, saturating well on RA, /67. RRR. CTAB. No edema. Considered ACS, anemia, electrolyte abnormalities. Low suspicion for PE, PNA, PTX, dissection. EKG, labs, and CXR ordered. Likely CDU observation for further cardiac testing.

## 2025-07-03 NOTE — ED ADULT NURSE REASSESSMENT NOTE - NS ED NURSE REASSESS COMMENT FT1
Break RN: Patient awake and resting in stretcher; respirations even and unlabored, no signs/symptoms of acute distress. Report given to CDU MERCEDES Cameron; repeat labs collected and sent, pt in stable condition and transported to CDU.

## 2025-07-03 NOTE — ED CDU PROVIDER INITIAL DAY NOTE - OBJECTIVE STATEMENT
Initial ED provider note: "70-year-old female with a history of A-fib on Eliquis, hypertension, diabetes, who presents with intermittent chest pain for the past week.  Had a syncopal episode 1 week ago, where developed lightheadedness, left-sided chest pressure while sitting in temple and then lost consciousness. Denies head trauma. Unsure duration of LOC but woke up feeling nauseated and sweaty. Did not get evaluated after this incident. Followed up with her PCP today who referred her to the ED. Now complains of left-sided chest pressure associated with nausea. Each episode would last 1-2 minutes and would occur at rest or with activity. Denies shortness of breath, orthopnea, REYES, leg pain or swelling. Denies fever, chills, vomiting. Does not recall last cardiologist visit, stress test, echo."  CDU note: Agree with above. In the ED, stable hemodynamics, EKG- NSR non-ischemic. Labs reviewed, no acute electrolytes or metabolic disturbance, trop 10-10, CXR clear lungs. CDU plan for stress, echo and tdd.

## 2025-07-03 NOTE — ED PROVIDER NOTE - OBJECTIVE STATEMENT
70-year-old female with a history of A-fib on Eliquis, hypertension, diabetes, who presents with intermittent chest pain for the past week.  Had a syncopal episode 1 week ago, where developed lightheadedness, left-sided chest pressure while sitting in temple and then lost consciousness. Denies head trauma. Unsure duration of LOC but woke up feeling nauseated and sweaty. Did not get evaluated after this incident. Followed up with her PCP today who referred her to the ED. Now complains of left-sided chest pressure associated with nausea. Would occur at rest or with activity. Denies shortness of breath, orthopnea, REYES, leg pain or swelling. Denies fever, chills, vomiting. Does not recall last cardiologist visit, stress test, echo. 70-year-old female with a history of A-fib on Eliquis, hypertension, diabetes, who presents with intermittent chest pain for the past week.  Had a syncopal episode 1 week ago, where developed lightheadedness, left-sided chest pressure while sitting in temple and then lost consciousness. Denies head trauma. Unsure duration of LOC but woke up feeling nauseated and sweaty. Did not get evaluated after this incident. Followed up with her PCP today who referred her to the ED. Now complains of left-sided chest pressure associated with nausea. Each episode would last 1-2 minutes and would occur at rest or with activity. Denies shortness of breath, orthopnea, REYES, leg pain or swelling. Denies fever, chills, vomiting. Does not recall last cardiologist visit, stress test, echo.

## 2025-07-04 VITALS
DIASTOLIC BLOOD PRESSURE: 76 MMHG | RESPIRATION RATE: 16 BRPM | OXYGEN SATURATION: 97 % | SYSTOLIC BLOOD PRESSURE: 154 MMHG | TEMPERATURE: 98 F | HEART RATE: 54 BPM

## 2025-07-04 LAB
ADD ON TEST-SPECIMEN IN LAB: SIGNIFICANT CHANGE UP
ANION GAP SERPL CALC-SCNC: 11 MMOL/L — SIGNIFICANT CHANGE UP (ref 7–14)
BASOPHILS # BLD AUTO: 0.03 K/UL — SIGNIFICANT CHANGE UP (ref 0–0.2)
BASOPHILS NFR BLD AUTO: 0.3 % — SIGNIFICANT CHANGE UP (ref 0–2)
BUN SERPL-MCNC: 17 MG/DL — SIGNIFICANT CHANGE UP (ref 7–23)
CALCIUM SERPL-MCNC: 8.6 MG/DL — SIGNIFICANT CHANGE UP (ref 8.4–10.5)
CHLORIDE SERPL-SCNC: 105 MMOL/L — SIGNIFICANT CHANGE UP (ref 98–107)
CHOLEST SERPL-MCNC: 74 MG/DL — SIGNIFICANT CHANGE UP
CO2 SERPL-SCNC: 23 MMOL/L — SIGNIFICANT CHANGE UP (ref 22–31)
CREAT SERPL-MCNC: 1.02 MG/DL — SIGNIFICANT CHANGE UP (ref 0.5–1.3)
EGFR: 60 ML/MIN/1.73M2 — SIGNIFICANT CHANGE UP
EGFR: 60 ML/MIN/1.73M2 — SIGNIFICANT CHANGE UP
EOSINOPHIL # BLD AUTO: 0.13 K/UL — SIGNIFICANT CHANGE UP (ref 0–0.5)
EOSINOPHIL NFR BLD AUTO: 1.5 % — SIGNIFICANT CHANGE UP (ref 0–6)
GLUCOSE SERPL-MCNC: 116 MG/DL — HIGH (ref 70–99)
HCT VFR BLD CALC: 29.8 % — LOW (ref 34.5–45)
HDLC SERPL-MCNC: 30 MG/DL — LOW
HGB BLD-MCNC: 9.5 G/DL — LOW (ref 11.5–15.5)
IMM GRANULOCYTES # BLD AUTO: 0.03 K/UL — SIGNIFICANT CHANGE UP (ref 0–0.07)
IMM GRANULOCYTES NFR BLD AUTO: 0.3 % — SIGNIFICANT CHANGE UP (ref 0–0.9)
LDLC SERPL-MCNC: 22 MG/DL — SIGNIFICANT CHANGE UP
LIPID PNL WITH DIRECT LDL SERPL: 22 MG/DL — SIGNIFICANT CHANGE UP
LYMPHOCYTES # BLD AUTO: 2.86 K/UL — SIGNIFICANT CHANGE UP (ref 1–3.3)
LYMPHOCYTES NFR BLD AUTO: 32.2 % — SIGNIFICANT CHANGE UP (ref 13–44)
MAGNESIUM SERPL-MCNC: 1.8 MG/DL — SIGNIFICANT CHANGE UP (ref 1.6–2.6)
MCHC RBC-ENTMCNC: 26.7 PG — LOW (ref 27–34)
MCHC RBC-ENTMCNC: 31.9 G/DL — LOW (ref 32–36)
MCV RBC AUTO: 83.7 FL — SIGNIFICANT CHANGE UP (ref 80–100)
MONOCYTES # BLD AUTO: 0.53 K/UL — SIGNIFICANT CHANGE UP (ref 0–0.9)
MONOCYTES NFR BLD AUTO: 6 % — SIGNIFICANT CHANGE UP (ref 2–14)
NEUTROPHILS # BLD AUTO: 5.31 K/UL — SIGNIFICANT CHANGE UP (ref 1.8–7.4)
NEUTROPHILS NFR BLD AUTO: 59.7 % — SIGNIFICANT CHANGE UP (ref 43–77)
NONHDLC SERPL-MCNC: 44 MG/DL — SIGNIFICANT CHANGE UP
NRBC # BLD AUTO: 0 K/UL — SIGNIFICANT CHANGE UP (ref 0–0)
NRBC # FLD: 0 K/UL — SIGNIFICANT CHANGE UP (ref 0–0)
NRBC BLD AUTO-RTO: 0 /100 WBCS — SIGNIFICANT CHANGE UP (ref 0–0)
PHOSPHATE SERPL-MCNC: 4 MG/DL — SIGNIFICANT CHANGE UP (ref 2.5–4.5)
PLATELET # BLD AUTO: 250 K/UL — SIGNIFICANT CHANGE UP (ref 150–400)
PMV BLD: 11 FL — SIGNIFICANT CHANGE UP (ref 7–13)
POTASSIUM SERPL-MCNC: 4.2 MMOL/L — SIGNIFICANT CHANGE UP (ref 3.5–5.3)
POTASSIUM SERPL-SCNC: 4.2 MMOL/L — SIGNIFICANT CHANGE UP (ref 3.5–5.3)
RBC # BLD: 3.56 M/UL — LOW (ref 3.8–5.2)
RBC # FLD: 13 % — SIGNIFICANT CHANGE UP (ref 10.3–14.5)
SODIUM SERPL-SCNC: 139 MMOL/L — SIGNIFICANT CHANGE UP (ref 135–145)
TRIGL SERPL-MCNC: 114 MG/DL — SIGNIFICANT CHANGE UP
TSH SERPL-MCNC: 1.67 UIU/ML — SIGNIFICANT CHANGE UP (ref 0.27–4.2)
WBC # BLD: 8.89 K/UL — SIGNIFICANT CHANGE UP (ref 3.8–10.5)
WBC # FLD AUTO: 8.89 K/UL — SIGNIFICANT CHANGE UP (ref 3.8–10.5)

## 2025-07-04 PROCEDURE — 99233 SBSQ HOSP IP/OBS HIGH 50: CPT

## 2025-07-04 PROCEDURE — 93018 CV STRESS TEST I&R ONLY: CPT | Mod: GC

## 2025-07-04 PROCEDURE — 93016 CV STRESS TEST SUPVJ ONLY: CPT | Mod: GC

## 2025-07-04 PROCEDURE — 78451 HT MUSCLE IMAGE SPECT SING: CPT | Mod: 26

## 2025-07-04 RX ADMIN — Medication 20 MILLIGRAM(S): at 12:05

## 2025-07-04 RX ADMIN — INSULIN LISPRO 1: 100 INJECTION, SOLUTION INTRAVENOUS; SUBCUTANEOUS at 12:05

## 2025-07-04 RX ADMIN — APIXABAN 5 MILLIGRAM(S): 2.5 TABLET, FILM COATED ORAL at 06:28

## 2025-07-04 NOTE — ED CDU PROVIDER DISPOSITION NOTE - NSFOLLOWUPINSTRUCTIONS_ED_ALL_ED_FT
Follow up with your primary care doctor within 48-72 hours. Follow up with Dr. Viera (cardiologist) for further management. Show copies of your reports given to you. Take all of your other medications as previously prescribed. Worsening or continued chest pain, shortness of breath, weakness,  or any other concerns return to ED.

## 2025-07-04 NOTE — ED CDU PROVIDER DISPOSITION NOTE - ATTENDING CONTRIBUTION TO CARE
I performed a face-to-face evaluation of the patient and performed a history and physical examination. I agree with the history and physical examination. If this was a PA visit, I personally saw the patient with the PA and performed a substantive portion of the visit including all aspects of the medical decision making.    A fib (Eliquis). Chest pressure and REYES. No significant murmur or new-CHF. Obs Unit for telemetry, stress test (unremarkable). Cards consulted. Gabo. Sski Pregnancy And Lactation Text: This medication is Pregnancy Category D and isn't considered safe during pregnancy. It is excreted in breast milk.

## 2025-07-04 NOTE — CONSULT NOTE ADULT - SUBJECTIVE AND OBJECTIVE BOX
CARDIOLOGY CONSULT NOTE - DR. HOUSER        Date of Service: 07-04-25 @ 12:58      HPI:      70-year-old female past medical history of A-fib on Eliquis, hypertension, diabetes presented to the ER complaining of chest pain x 1 week.  Also had an episode of syncope while at [employer] 1 week ago.  Labs significant for hemoglobin of 9.3, unchanged this morning.  Troponins flat, 10 and 10, chest x-ray clear.   no cp, saob     PAST MEDICAL & SURGICAL HISTORY:  HTN (hypertension)      Afib      DM (diabetes mellitus)      No significant past surgical history            PREVIOUS DIAGNOSTIC TESTING:    [ ] Echocardiogram:  [ ]  Catheterization:  [ ] Stress Test:  	    MEDICATIONS:    Home Medications:  atenolol 25 mg oral tablet: 1 tab(s) orally once a day (05 Aug 2017 12:11)  Tylenol 325 mg oral tablet: 2 tab(s) orally every 4 hours, As Needed (05 Aug 2017 12:11)      MEDICATIONS  (STANDING):  apixaban 5 milliGRAM(s) Oral every 12 hours  atenolol  Tablet 25 milliGRAM(s) Oral daily  dextrose 5%. 1000 milliLiter(s) (50 mL/Hr) IV Continuous <Continuous>  dextrose 5%. 1000 milliLiter(s) (100 mL/Hr) IV Continuous <Continuous>  dextrose 50% Injectable 25 Gram(s) IV Push once  dextrose 50% Injectable 12.5 Gram(s) IV Push once  dextrose 50% Injectable 25 Gram(s) IV Push once  famotidine    Tablet 20 milliGRAM(s) Oral daily  glucagon  Injectable 1 milliGRAM(s) IntraMuscular once  insulin lispro (ADMELOG) corrective regimen sliding scale   SubCutaneous three times a day before meals  insulin lispro (ADMELOG) corrective regimen sliding scale   SubCutaneous at bedtime  losartan 50 milliGRAM(s) Oral daily  rosuvastatin 20 milliGRAM(s) Oral at bedtime      FAMILY HISTORY:  No pertinent family history in first degree relatives        SOCIAL HISTORY:    [ ] Non-smoker  [ ] Smoker  [ ] Alcohol    Allergies    No Known Allergies    Intolerances    	    REVIEW OF SYSTEMS:  CONSTITUTIONAL: No fever, weight loss, or fatigue  EYES: No eye pain, visual disturbances, or discharge  ENMT:  No difficulty hearing, tinnitus, vertigo; No sinus or throat pain  NECK: No pain or stiffness  RESPIRATORY: No cough, wheezing, chills or hemoptysis; No Shortness of Breath  CARDIOVASCULAR: as HPI  GASTROINTESTINAL: No abdominal or epigastric pain. No nausea, vomiting, or hematemesis; No diarrhea or constipation. No melena or hematochezia.  GENITOURINARY: No dysuria, frequency, hematuria, or incontinence  NEUROLOGICAL: No headaches, memory loss, loss of strength, numbness, or tremors  SKIN: No itching, burning, rashes, or lesions   	  [ ] All others negative	  [ ] Unable to obtain    PHYSICAL EXAM:    T(C): 36.5 (07-04-25 @ 09:52), Max: 36.7 (07-03-25 @ 13:15)  HR: 65 (07-04-25 @ 09:52) (52 - 65)  BP: 167/66 (07-04-25 @ 09:52) (121/64 - 180/75)  RR: 16 (07-04-25 @ 09:52) (16 - 17)  SpO2: 97% (07-04-25 @ 09:52) (96% - 98%)  Wt(kg): --  I&O's Summary    Daily Height in cm: 160.02 (03 Jul 2025 13:15)    Daily     Appearance: Normal	  Psychiatry: A & O x 3, Mood & affect appropriate  HEENT:   Normal oral mucosa, PERRL, EOMI	  Lymphatic: No lymphadenopathy  Cardiovascular: Normal S1 S2,RRR, No JVD, No murmurs  Respiratory: Lungs clear to auscultation	  Gastrointestinal:  Soft, Non-tender, + BS	  Skin: No rashes, No ecchymoses, No cyanosis	  Neurologic: Non-focal  Extremities: Normal range of motion, No clubbing, cyanosis or edema  Vascular: Peripheral pulses palpable 2+ bilaterally    TELEMETRY: 	    ECG:  	  RADIOLOGY:  OTHER: 	  	  LABS:	 	    CARDIAC MARKERS:        proBNP:     Lipid Profile:   HgA1c:   TSH: Thyroid Stimulating Hormone, Serum: 1.67 uIU/mL (07-04-25 @ 06:14)                            9.5    8.89  )-----------( 250      ( 04 Jul 2025 06:14 )             29.8     07-04    139  |  105  |  17  ----------------------------<  116[H]  4.2   |  23  |  1.02    Ca    8.6      04 Jul 2025 06:14  Phos  4.0     07-04  Mg     1.80     07-04    TPro  7.3  /  Alb  3.6  /  TBili  0.2  /  DBili  x   /  AST  13  /  ALT  7   /  AlkPhos  85  07-03    PT/INR - ( 03 Jul 2025 14:25 )   PT: 15.5 sec;   INR: 1.31 ratio         PTT - ( 03 Jul 2025 14:25 )  PTT:32.9 sec    Creatinine: 1.02 mg/dL (07-04-25 @ 06:14)  Creatinine: 1.17 mg/dL (07-03-25 @ 14:25)        ASSESSMENT/PLAN:

## 2025-07-04 NOTE — ED CDU PROVIDER DISPOSITION NOTE - CLINICAL COURSE
70-year-old female past medical history of A-fib on Eliquis, hypertension, diabetes presented to the ER complaining of chest pain x 1 week.  Also had an episode of syncope while at [employer] 1 week ago.  Labs significant for hemoglobin of 9.3, unchanged this morning.  Troponins flat, 10 and 10, chest x-ray clear. Stress test [____________________________], pro bnp added to am labs [___________]. Pt seen by cardiology who recommend [________________________________]. 70-year-old female past medical history of A-fib on Eliquis, hypertension, diabetes presented to the ER complaining of chest pain x 1 week.  Also had an episode of syncope while at [employer] 1 week ago.  Labs significant for hemoglobin of 9.3, unchanged this morning.  Troponins flat, 10 and 10, chest x-ray clear. Stress test with no evidence of infarction or inducible ischemia, pro bnp added to am labs 358. Pt seen by cardiology who recommend outpatient follow up. 70-year-old female past medical history of A-fib on Eliquis, hypertension, diabetes presented to the ER complaining of chest pain x 1 week.  Also had an episode of syncope while at [employer] 1 week ago.  Labs significant for hemoglobin of 9.3, unchanged this morning.  Troponins flat, 10 and 10, chest x-ray clear. Stress test with no evidence of infarction or inducible ischemia, pro bnp added to am labs 358. Pt seen by cardiology who recommend outpatient follow up. Discharge instructions with labs results and stress results discussed with pt's son at bedside. Pt's son translated for patient himself, declined  service.

## 2025-07-04 NOTE — ED CDU PROVIDER SUBSEQUENT DAY NOTE - HISTORY
69 yo female, PMH atrial fibrillation on Eliquis, HTN, DM, presented to the ED c/o intermittent chest pain x one week; reported having a syncopal episode 1 week ago, where she first developed lightheadedness, then left-sided chest pressure while sitting in temple and then lost consciousness. Unsure duration of LOC but woke up feeling nauseated and sweaty. Did not get evaluated after this incident. Followed up with her PCP who referred her to the ED.  Describes intermittent chest discomfort where each episode would last 1-2 minutes and would occur at rest or with activity. Denied shortness of breath, orthopnea, REYES, leg pain or swelling. Denies fever, chills, vomiting.  In the ED, VSS. EKG NSR.  WBC 9.68, Hb 9.3, platelets 261.  INR 1.31.  CMP (mildly hemolyzed): grossly unremarkable, glucose 133.  A1c 7.1.  Troponin 10 ---> 10., CXR was unremarkable.  Pt was sent to CDU for tele monitoring, stress, echo and Tele Doc of Day cardiology evaluation.  In the interim, pt objectively noted to be resting comfortably; pt has been clinically stable; no issues thus far.  AM labs are ordered.

## 2025-07-04 NOTE — ED CDU PROVIDER DISPOSITION NOTE - PATIENT PORTAL LINK FT
You can access the FollowMyHealth Patient Portal offered by [facility] by registering at the following website: [email or web address]. By joining [employer]’s FollowMyHealth portal, you will also be able to view your health information using other applications (apps) compatible with our system.

## 2025-07-04 NOTE — ED CDU PROVIDER DISPOSITION NOTE - CARE PROVIDER_API CALL
Dashawn Viera  Cardiovascular Disease  [address], [address]  [place] [address]  Phone: [phone]  Fax: [phone]  Follow Up Time: Routine

## 2025-07-04 NOTE — ED CDU PROVIDER SUBSEQUENT DAY NOTE - NS ED ATTENDING STATEMENT MOD
I have seen and examined this patient and fully participated in the care of this patient as the teaching attending.  The service was shared with the ANGELA.  I reviewed and verified the documentation.

## 2025-07-04 NOTE — CONSULT NOTE ADULT - ASSESSMENT
A/P  70-year-old female past medical history of A-fib on Eliquis, hypertension, diabetes presented to the ER complaining of chest pain x 1 week.  Also had an episode of syncope while at [employer] 1 week ago.       #chest pain  -atypical sx  -no acs  -stress with no ischemia, normal EF  -no tele events    stable for d/c     77 minutes spent on total encounter; more than 50% of the visit was spent counseling and/or coordinating care by the attending physician.

## 2025-07-04 NOTE — ED CDU PROVIDER SUBSEQUENT DAY NOTE - PHYSICAL EXAMINATION
CONSTITUTIONAL:  Well appearing, awake, alert, oriented to person, place, time/situation and in no apparent distress.  Pt. is objectively comfortable appearing and verbalizing in full, clear, effortless sentences.  ENMT: NC/AT.  Airway patent.  Nasal mucosa clear.  Moist mucous membranes.  Neck supple.  EYES:  Clear OU.  CARDIAC:  Normal rate, regular rhythm.  Heart sounds S1 S2.  No murmurs, gallops, or rubs.  RESPIRATORY:  Breath sounds clear and equal bilaterally.  No wheezes, no rales, no rhonchi.  GASTROINTESTINAL:  Abdomen soft, non-distended, non-tender.  No rebound, no guarding.  NEUROLOGICAL:  Alert and oriented to person/place/time/situation.  No gross deficits; no tremors noted.   MUSCULOSKELETAL:  Range of motion is not limited.    SKIN:  Skin color unremarkable.  Skin warm, dry, and intact.    PSYCHIATRIC:  Alert and oriented to person/place/time/situation.  Mood and affect WNL.  No apparent risk to self or others.

## 2025-07-04 NOTE — ED CDU PROVIDER SUBSEQUENT DAY NOTE - ATTENDING CONTRIBUTION TO CARE
I performed a face-to-face evaluation of the patient and performed a history and physical examination. I agree with the history and physical examination. If this was a PA visit, I personally saw the patient with the PA and performed a substantive portion of the visit including all aspects of the medical decision making.    A fib (Eliquis). Chest pressure and REYES. No significant murmur or new-CHF. Obs Unit for telemetry, stress test, and Cards consult.

## 2025-07-04 NOTE — ED CDU PROVIDER SUBSEQUENT DAY NOTE - CLINICAL SUMMARY MEDICAL DECISION MAKING FREE TEXT BOX
71 yo female, PMH atrial fibrillation on Eliquis, HTN, DM, presented to the ED c/o intermittent chest pain x one week; reported having a syncopal episode 1 week ago, where she first developed lightheadedness, then left-sided chest pressure while sitting in temple and then lost consciousness. Unsure duration of LOC but woke up feeling nauseated and sweaty. Did not get evaluated after this incident. Followed up with her PCP who referred her to the ED.  Describes intermittent chest discomfort where each episode would last 1-2 minutes and would occur at rest or with activity. Denied shortness of breath, orthopnea, REYES, leg pain or swelling. Denies fever, chills, vomiting.  In the ED, VSS. EKG NSR.  WBC 9.68, Hb 9.3, platelets 261.  INR 1.31.  CMP (mildly hemolyzed): grossly unremarkable, glucose 133.  A1c 7.1.  Troponin 10 ---> 10., CXR was unremarkable.  Pt was sent to CDU for tele monitoring, stress, echo and Tele Doc of Day cardiology evaluation.  In the interim, pt objectively noted to be resting comfortably; pt has been clinically stable; no issues thus far.  AM labs are ordered.